# Patient Record
Sex: FEMALE | Race: WHITE | NOT HISPANIC OR LATINO | URBAN - METROPOLITAN AREA
[De-identification: names, ages, dates, MRNs, and addresses within clinical notes are randomized per-mention and may not be internally consistent; named-entity substitution may affect disease eponyms.]

---

## 2017-05-12 ENCOUNTER — ON CAMPUS - OUTPATIENT (AMBULATORY)
Dept: URBAN - METROPOLITAN AREA HOSPITAL 108 | Facility: HOSPITAL | Age: 77
End: 2017-05-12
Payer: COMMERCIAL

## 2017-05-12 DIAGNOSIS — K31.7 POLYP OF STOMACH AND DUODENUM: ICD-10-CM

## 2017-05-12 DIAGNOSIS — K64.4 RESIDUAL HEMORRHOIDAL SKIN TAGS: ICD-10-CM

## 2017-05-12 DIAGNOSIS — K57.30 DIVERTICULOSIS OF LARGE INTESTINE WITHOUT PERFORATION OR ABS: ICD-10-CM

## 2017-05-12 DIAGNOSIS — Z86.010 PERSONAL HISTORY OF COLONIC POLYPS: ICD-10-CM

## 2017-05-12 DIAGNOSIS — K63.5 POLYP OF COLON: ICD-10-CM

## 2017-05-12 DIAGNOSIS — K22.2 ESOPHAGEAL OBSTRUCTION: ICD-10-CM

## 2017-05-12 DIAGNOSIS — K21.9 GASTRO-ESOPHAGEAL REFLUX DISEASE WITHOUT ESOPHAGITIS: ICD-10-CM

## 2017-05-12 DIAGNOSIS — R13.10 DYSPHAGIA, UNSPECIFIED: ICD-10-CM

## 2017-05-12 DIAGNOSIS — K44.9 DIAPHRAGMATIC HERNIA WITHOUT OBSTRUCTION OR GANGRENE: ICD-10-CM

## 2017-05-12 DIAGNOSIS — K29.50 UNSPECIFIED CHRONIC GASTRITIS WITHOUT BLEEDING: ICD-10-CM

## 2017-05-12 PROCEDURE — 43239 EGD BIOPSY SINGLE/MULTIPLE: CPT | Mod: 59 | Performed by: INTERNAL MEDICINE

## 2017-05-12 PROCEDURE — 45385 COLONOSCOPY W/LESION REMOVAL: CPT | Performed by: INTERNAL MEDICINE

## 2017-05-12 PROCEDURE — 43249 ESOPH EGD DILATION <30 MM: CPT | Performed by: INTERNAL MEDICINE

## 2017-11-03 ENCOUNTER — OFFICE (AMBULATORY)
Dept: URBAN - METROPOLITAN AREA CLINIC 70 | Facility: CLINIC | Age: 77
End: 2017-11-03

## 2017-11-03 VITALS
DIASTOLIC BLOOD PRESSURE: 72 MMHG | HEIGHT: 64 IN | HEART RATE: 74 BPM | SYSTOLIC BLOOD PRESSURE: 118 MMHG | WEIGHT: 184 LBS

## 2017-11-03 DIAGNOSIS — Z86.010 PERSONAL HISTORY OF COLONIC POLYPS: ICD-10-CM

## 2017-11-03 DIAGNOSIS — K22.2 ESOPHAGEAL OBSTRUCTION: ICD-10-CM

## 2017-11-03 DIAGNOSIS — K64.4 RESIDUAL HEMORRHOIDAL SKIN TAGS: ICD-10-CM

## 2017-11-03 DIAGNOSIS — R13.10 DYSPHAGIA, UNSPECIFIED: ICD-10-CM

## 2017-11-03 DIAGNOSIS — K21.9 GASTRO-ESOPHAGEAL REFLUX DISEASE WITHOUT ESOPHAGITIS: ICD-10-CM

## 2017-11-03 DIAGNOSIS — K44.9 DIAPHRAGMATIC HERNIA WITHOUT OBSTRUCTION OR GANGRENE: ICD-10-CM

## 2017-11-03 DIAGNOSIS — K29.50 UNSPECIFIED CHRONIC GASTRITIS WITHOUT BLEEDING: ICD-10-CM

## 2017-11-03 DIAGNOSIS — K57.30 DIVERTICULOSIS OF LARGE INTESTINE WITHOUT PERFORATION OR ABS: ICD-10-CM

## 2017-11-03 PROCEDURE — 99213 OFFICE O/P EST LOW 20 MIN: CPT | Performed by: INTERNAL MEDICINE

## 2017-12-06 ENCOUNTER — HOSPITAL ENCOUNTER (OUTPATIENT)
Facility: HOSPITAL | Age: 77
Setting detail: SURGERY ADMIT
End: 2017-12-06
Attending: SURGERY | Admitting: SURGERY

## 2018-01-29 ENCOUNTER — APPOINTMENT (OUTPATIENT)
Dept: PREADMISSION TESTING | Facility: HOSPITAL | Age: 78
End: 2018-01-29

## 2019-08-22 ENCOUNTER — APPOINTMENT (OUTPATIENT)
Dept: GENERAL RADIOLOGY | Facility: HOSPITAL | Age: 79
End: 2019-08-22

## 2019-08-22 ENCOUNTER — HOSPITAL ENCOUNTER (EMERGENCY)
Facility: HOSPITAL | Age: 79
Discharge: HOME | End: 2019-08-22
Attending: EMERGENCY MEDICINE | Admitting: EMERGENCY MEDICINE

## 2019-08-22 VITALS
SYSTOLIC BLOOD PRESSURE: 154 MMHG | DIASTOLIC BLOOD PRESSURE: 65 MMHG | OXYGEN SATURATION: 92 % | HEIGHT: 67 IN | HEART RATE: 70 BPM | RESPIRATION RATE: 16 BRPM | TEMPERATURE: 97.5 F

## 2019-08-22 DIAGNOSIS — F03.91 DEMENTIA WITH BEHAVIORAL DISTURBANCE, UNSPECIFIED DEMENTIA TYPE: ICD-10-CM

## 2019-08-22 DIAGNOSIS — N30.00 ACUTE CYSTITIS WITHOUT HEMATURIA: Primary | ICD-10-CM

## 2019-08-22 LAB
ALBUMIN SERPL-MCNC: 3.7 G/DL (ref 3.5–5.2)
ALBUMIN/GLOB SERPL: 1.3 G/DL
ALP SERPL-CCNC: 79 U/L (ref 39–117)
ALT SERPL W P-5'-P-CCNC: 20 U/L (ref 1–33)
AMPHET+METHAMPHET UR QL: NEGATIVE
ANION GAP SERPL CALCULATED.3IONS-SCNC: 10.7 MMOL/L (ref 5–15)
AST SERPL-CCNC: 39 U/L (ref 1–32)
BACTERIA UR QL AUTO: ABNORMAL /HPF
BARBITURATES UR QL SCN: NEGATIVE
BASOPHILS # BLD AUTO: 0.01 10*3/MM3 (ref 0–0.2)
BASOPHILS NFR BLD AUTO: 0.1 % (ref 0–1.5)
BENZODIAZ UR QL SCN: NEGATIVE
BILIRUB SERPL-MCNC: 0.3 MG/DL (ref 0.2–1.2)
BILIRUB UR QL STRIP: NEGATIVE
BUN BLD-MCNC: 27 MG/DL (ref 8–23)
BUN/CREAT SERPL: 16.9 (ref 7–25)
CALCIUM SPEC-SCNC: 6.8 MG/DL (ref 8.6–10.5)
CANNABINOIDS SERPL QL: NEGATIVE
CHLORIDE SERPL-SCNC: 109 MMOL/L (ref 98–107)
CLARITY UR: ABNORMAL
CO2 SERPL-SCNC: 24.3 MMOL/L (ref 22–29)
COCAINE UR QL: NEGATIVE
COLOR UR: YELLOW
CREAT BLD-MCNC: 1.6 MG/DL (ref 0.57–1)
DEPRECATED RDW RBC AUTO: 55.5 FL (ref 37–54)
EOSINOPHIL # BLD AUTO: 0.23 10*3/MM3 (ref 0–0.4)
EOSINOPHIL NFR BLD AUTO: 3.3 % (ref 0.3–6.2)
ERYTHROCYTE [DISTWIDTH] IN BLOOD BY AUTOMATED COUNT: 14.8 % (ref 12.3–15.4)
ETHANOL BLD-MCNC: <10 MG/DL (ref 0–10)
ETHANOL UR QL: <0.01 %
GFR SERPL CREATININE-BSD FRML MDRD: 31 ML/MIN/1.73
GLOBULIN UR ELPH-MCNC: 2.9 GM/DL
GLUCOSE BLD-MCNC: 116 MG/DL (ref 65–99)
GLUCOSE UR STRIP-MCNC: NEGATIVE MG/DL
GRAN CASTS URNS QL MICRO: ABNORMAL /LPF
HCT VFR BLD AUTO: 41.5 % (ref 34–46.6)
HGB BLD-MCNC: 13 G/DL (ref 12–15.9)
HGB UR QL STRIP.AUTO: ABNORMAL
HYALINE CASTS UR QL AUTO: ABNORMAL /LPF
IMM GRANULOCYTES # BLD AUTO: 0.02 10*3/MM3 (ref 0–0.05)
IMM GRANULOCYTES NFR BLD AUTO: 0.3 % (ref 0–0.5)
KETONES UR QL STRIP: ABNORMAL
LEUKOCYTE ESTERASE UR QL STRIP.AUTO: ABNORMAL
LYMPHOCYTES # BLD AUTO: 2.03 10*3/MM3 (ref 0.7–3.1)
LYMPHOCYTES NFR BLD AUTO: 28.8 % (ref 19.6–45.3)
MCH RBC QN AUTO: 31.9 PG (ref 26.6–33)
MCHC RBC AUTO-ENTMCNC: 31.3 G/DL (ref 31.5–35.7)
MCV RBC AUTO: 102 FL (ref 79–97)
METHADONE UR QL SCN: NEGATIVE
MONOCYTES # BLD AUTO: 0.59 10*3/MM3 (ref 0.1–0.9)
MONOCYTES NFR BLD AUTO: 8.4 % (ref 5–12)
NEUTROPHILS # BLD AUTO: 4.18 10*3/MM3 (ref 1.7–7)
NEUTROPHILS NFR BLD AUTO: 59.1 % (ref 42.7–76)
NITRITE UR QL STRIP: POSITIVE
NRBC BLD AUTO-RTO: 0.1 /100 WBC (ref 0–0.2)
OPIATES UR QL: NEGATIVE
OXYCODONE UR QL SCN: NEGATIVE
PH UR STRIP.AUTO: <=5 [PH] (ref 5–8)
PLATELET # BLD AUTO: 157 10*3/MM3 (ref 140–450)
PMV BLD AUTO: 10.3 FL (ref 6–12)
POTASSIUM BLD-SCNC: 5.1 MMOL/L (ref 3.5–5.2)
PROT SERPL-MCNC: 6.6 G/DL (ref 6–8.5)
PROT UR QL STRIP: ABNORMAL
RBC # BLD AUTO: 4.07 10*6/MM3 (ref 3.77–5.28)
RBC # UR: ABNORMAL /HPF
REF LAB TEST METHOD: ABNORMAL
SODIUM BLD-SCNC: 144 MMOL/L (ref 136–145)
SP GR UR STRIP: 1.02 (ref 1–1.03)
SQUAMOUS #/AREA URNS HPF: ABNORMAL /HPF
TROPONIN T SERPL-MCNC: <0.01 NG/ML (ref 0–0.03)
TSH SERPL DL<=0.05 MIU/L-ACNC: 1.25 MIU/ML (ref 0.27–4.2)
UROBILINOGEN UR QL STRIP: ABNORMAL
WBC NRBC COR # BLD: 7.06 10*3/MM3 (ref 3.4–10.8)
WBC UR QL AUTO: ABNORMAL /HPF

## 2019-08-22 PROCEDURE — 80307 DRUG TEST PRSMV CHEM ANLYZR: CPT | Performed by: PHYSICIAN ASSISTANT

## 2019-08-22 PROCEDURE — 80053 COMPREHEN METABOLIC PANEL: CPT | Performed by: PHYSICIAN ASSISTANT

## 2019-08-22 PROCEDURE — 93010 ELECTROCARDIOGRAM REPORT: CPT | Performed by: INTERNAL MEDICINE

## 2019-08-22 PROCEDURE — 84443 ASSAY THYROID STIM HORMONE: CPT | Performed by: EMERGENCY MEDICINE

## 2019-08-22 PROCEDURE — 84484 ASSAY OF TROPONIN QUANT: CPT | Performed by: PHYSICIAN ASSISTANT

## 2019-08-22 PROCEDURE — 81001 URINALYSIS AUTO W/SCOPE: CPT | Performed by: PHYSICIAN ASSISTANT

## 2019-08-22 PROCEDURE — 87086 URINE CULTURE/COLONY COUNT: CPT | Performed by: PHYSICIAN ASSISTANT

## 2019-08-22 PROCEDURE — 85025 COMPLETE CBC W/AUTO DIFF WBC: CPT | Performed by: PHYSICIAN ASSISTANT

## 2019-08-22 PROCEDURE — 87147 CULTURE TYPE IMMUNOLOGIC: CPT | Performed by: PHYSICIAN ASSISTANT

## 2019-08-22 PROCEDURE — 93005 ELECTROCARDIOGRAM TRACING: CPT | Performed by: PHYSICIAN ASSISTANT

## 2019-08-22 PROCEDURE — 71045 X-RAY EXAM CHEST 1 VIEW: CPT

## 2019-08-22 PROCEDURE — 87186 SC STD MICRODIL/AGAR DIL: CPT | Performed by: PHYSICIAN ASSISTANT

## 2019-08-22 PROCEDURE — 99285 EMERGENCY DEPT VISIT HI MDM: CPT

## 2019-08-22 RX ORDER — CEPHALEXIN 500 MG/1
500 CAPSULE ORAL 2 TIMES DAILY
Qty: 14 CAPSULE | Refills: 0 | Status: SHIPPED | OUTPATIENT
Start: 2019-08-22 | End: 2019-08-29

## 2019-08-22 RX ORDER — CEPHALEXIN 500 MG/1
500 CAPSULE ORAL ONCE
Status: COMPLETED | OUTPATIENT
Start: 2019-08-22 | End: 2019-08-22

## 2019-08-22 RX ADMIN — CEPHALEXIN 500 MG: 500 CAPSULE ORAL at 22:20

## 2019-08-22 NOTE — ED PROVIDER NOTES
"EMERGENCY DEPARTMENT ENCOUNTER    Room Number:  28/28  Date seen:  8/22/2019  Time seen: 7:13 PM  PCP: Luis Williamson MD    HPI:  Chief complaint: Behavioral Issues  Context:Tiara Hernandes is a 78 y.o. female who presents to the ED with reported behavioral issues that's occurred today per the . Pt denies SI and HI. Pt states that she called the police earlier today due to an argument with her  in which became \"heated\" and EMS brought her to the ED and she states that she was not feeling well. Pt's  states that the pt was dx with Dementia in 2015, also has hx of Bipolar.  He states that EMS offered to bring her to the hospital and she excepted, he does not feel her current behavior is significantly out of the ordinary for her.    Onset: gradual  Location:n/a  Radiation: n/a  Duration: today  Timing: constant  Character:behavior issues  Aggravating Factors: none none  Alleviating Factors: none  Severity: mild      ALLERGIES  Patient has no known allergies.    PAST MEDICAL HISTORY  Active Ambulatory Problems     Diagnosis Date Noted   • No Active Ambulatory Problems     Resolved Ambulatory Problems     Diagnosis Date Noted   • No Resolved Ambulatory Problems     No Additional Past Medical History       PAST SURGICAL HISTORY  No past surgical history on file.    FAMILY HISTORY  No family history on file.    SOCIAL HISTORY  Social History     Socioeconomic History   • Marital status:      Spouse name: Not on file   • Number of children: Not on file   • Years of education: Not on file   • Highest education level: Not on file       REVIEW OF SYSTEMS  Review of Systems   Unable to perform ROS: Dementia   Constitutional: Negative for chills and fever.   HENT: Negative.    Respiratory: Negative for cough and shortness of breath.    Cardiovascular: Negative for chest pain.   Gastrointestinal: Negative for abdominal pain, nausea and vomiting.   Genitourinary: Negative for dysuria and hematuria. "   Skin: Negative for rash.   Neurological: Negative.    Psychiatric/Behavioral: Positive for behavioral problems. Negative for suicidal ideas.       PHYSICAL EXAM  ED Triage Vitals   Temp Heart Rate Resp BP SpO2   08/22/19 1830 08/22/19 1830 08/22/19 1830 08/22/19 1830 08/22/19 1844   97.5 °F (36.4 °C) 106 16 105/62 94 %      Temp src Heart Rate Source Patient Position BP Location FiO2 (%)   08/22/19 1830 -- -- -- --   Tympanic         Physical Exam   Constitutional: She is well-developed, well-nourished, and in no distress.   HENT:   Head: Normocephalic and atraumatic.   Right Ear: External ear normal.   Left Ear: External ear normal.   Nose: Nose normal.   Mouth/Throat: Oropharynx is clear and moist and mucous membranes are normal.   Eyes: Conjunctivae are normal.   Neck: Normal range of motion.   Cardiovascular: Regular rhythm. Tachycardia present.   Pulmonary/Chest: Effort normal and breath sounds normal.   Abdominal: Soft. She exhibits no distension. There is no tenderness.   Musculoskeletal: Normal range of motion.   Neurological: She is alert.   Pt is oriented to person and place, disoriented to the date. No focal neuro deficits noted.   Skin: Skin is warm and dry.   Psychiatric: Mood and affect normal. She expresses no homicidal and no suicidal ideation.   Nursing note and vitals reviewed.      LAB RESULTS  Recent Results (from the past 24 hour(s))   Comprehensive Metabolic Panel    Collection Time: 08/22/19  7:57 PM   Result Value Ref Range    Glucose 116 (H) 65 - 99 mg/dL    BUN 27 (H) 8 - 23 mg/dL    Creatinine 1.60 (H) 0.57 - 1.00 mg/dL    Sodium 144 136 - 145 mmol/L    Potassium 5.1 3.5 - 5.2 mmol/L    Chloride 109 (H) 98 - 107 mmol/L    CO2 24.3 22.0 - 29.0 mmol/L    Calcium 6.8 (L) 8.6 - 10.5 mg/dL    Total Protein 6.6 6.0 - 8.5 g/dL    Albumin 3.70 3.50 - 5.20 g/dL    ALT (SGPT) 20 1 - 33 U/L    AST (SGOT) 39 (H) 1 - 32 U/L    Alkaline Phosphatase 79 39 - 117 U/L    Total Bilirubin 0.3 0.2 - 1.2  mg/dL    eGFR Non African Amer 31 (L) >60 mL/min/1.73    Globulin 2.9 gm/dL    A/G Ratio 1.3 g/dL    BUN/Creatinine Ratio 16.9 7.0 - 25.0    Anion Gap 10.7 5.0 - 15.0 mmol/L   Troponin    Collection Time: 08/22/19  7:57 PM   Result Value Ref Range    Troponin T <0.010 0.000 - 0.030 ng/mL   Ethanol    Collection Time: 08/22/19  7:57 PM   Result Value Ref Range    Ethanol <10 0 - 10 mg/dL    Ethanol % <0.010 %   CBC Auto Differential    Collection Time: 08/22/19  7:57 PM   Result Value Ref Range    WBC 7.06 3.40 - 10.80 10*3/mm3    RBC 4.07 3.77 - 5.28 10*6/mm3    Hemoglobin 13.0 12.0 - 15.9 g/dL    Hematocrit 41.5 34.0 - 46.6 %    .0 (H) 79.0 - 97.0 fL    MCH 31.9 26.6 - 33.0 pg    MCHC 31.3 (L) 31.5 - 35.7 g/dL    RDW 14.8 12.3 - 15.4 %    RDW-SD 55.5 (H) 37.0 - 54.0 fl    MPV 10.3 6.0 - 12.0 fL    Platelets 157 140 - 450 10*3/mm3    Neutrophil % 59.1 42.7 - 76.0 %    Lymphocyte % 28.8 19.6 - 45.3 %    Monocyte % 8.4 5.0 - 12.0 %    Eosinophil % 3.3 0.3 - 6.2 %    Basophil % 0.1 0.0 - 1.5 %    Immature Grans % 0.3 0.0 - 0.5 %    Neutrophils, Absolute 4.18 1.70 - 7.00 10*3/mm3    Lymphocytes, Absolute 2.03 0.70 - 3.10 10*3/mm3    Monocytes, Absolute 0.59 0.10 - 0.90 10*3/mm3    Eosinophils, Absolute 0.23 0.00 - 0.40 10*3/mm3    Basophils, Absolute 0.01 0.00 - 0.20 10*3/mm3    Immature Grans, Absolute 0.02 0.00 - 0.05 10*3/mm3    nRBC 0.1 0.0 - 0.2 /100 WBC   TSH    Collection Time: 08/22/19  7:57 PM   Result Value Ref Range    TSH 1.250 0.270 - 4.200 mIU/mL   Urinalysis With Microscopic If Indicated (No Culture) - Urine, Catheter    Collection Time: 08/22/19  8:02 PM   Result Value Ref Range    Color, UA Yellow Yellow, Straw    Appearance, UA Cloudy (A) Clear    pH, UA <=5.0 5.0 - 8.0    Specific Gravity, UA 1.023 1.005 - 1.030    Glucose, UA Negative Negative    Ketones, UA Trace (A) Negative    Bilirubin, UA Negative Negative    Blood, UA Moderate (2+) (A) Negative    Protein, UA 30 mg/dL (1+) (A) Negative     Leuk Esterase, UA Moderate (2+) (A) Negative    Nitrite, UA Positive (A) Negative    Urobilinogen, UA 1.0 E.U./dL 0.2 - 1.0 E.U./dL   Urine Drug Screen - Urine, Catheter    Collection Time: 08/22/19  8:02 PM   Result Value Ref Range    Amphet/Methamphet, Screen Negative Negative    Barbiturates Screen, Urine Negative Negative    Benzodiazepine Screen, Urine Negative Negative    Cocaine Screen, Urine Negative Negative    Opiate Screen Negative Negative    THC, Screen, Urine Negative Negative    Methadone Screen, Urine Negative Negative    Oxycodone Screen, Urine Negative Negative   Urinalysis, Microscopic Only - Urine, Catheter    Collection Time: 08/22/19  8:02 PM   Result Value Ref Range    RBC, UA 3-5 (A) None Seen, 0-2 /HPF    WBC, UA Too Numerous to Count (A) None Seen, 0-2 /HPF    Bacteria, UA Trace (A) None Seen /HPF    Squamous Epithelial Cells, UA 0-2 None Seen, 0-2 /HPF    Hyaline Casts, UA None Seen None Seen /LPF    Granular Casts, UA 0-2 None Seen /LPF    Methodology Manual Light Microscopy        I ordered the above labs and reviewed the results    RADIOLOGY  XR Chest 1 View   Final Result   FINDINGS AND IMPRESSION:   Left pacemaker is present. 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 There is a right shoulder arthroplasty which isn't   well-visualized.       The lungs are hypoinflated. The left base is not well evaluated due to   patient body habitus. Asymmetric opacification over the left base may be   related to overlying soft tissue density versus acute infiltrate and   correlation with patient history is recommended. Findings can be further   evaluated with PA and lateral standing chest radiographs if clinically   indicated.       No pleural effusion or pneumothorax is seen. The heart size is   accentuated by low lung volumes..       This report was finalized on 8/22/2019 7:47 PM by Dr. Rainer Salinas M.D.              I ordered the above noted radiological studies and reviewed the  "images on the PACS system.      MEDICATIONS GIVEN IN ER  Medications   cephalexin (KEFLEX) capsule 500 mg (not administered)       EKG  Interpreted by ED Physician Dr. Schwartz    PROCEDURES  Procedures      PROGRESS AND CONSULTS     2129- Rechecked pt who is resting comfortably in NAD. Informed pt of the lab and imaging results. D/w pt the plan to DC with abx. Pt understands and agrees with plan. All questions answered.        2136- Reviewed pt's history and workup with Dr. Schwartz.  After a bedside evaluation; Dr. Schwartz agrees with the plan of care      Disposition vitals:  /64   Pulse 102   Temp 97.5 °F (36.4 °C) (Tympanic)   Resp 16   Ht 170.2 cm (67\")   SpO2 94%       DIAGNOSIS  Final diagnoses:   Acute cystitis without hematuria   Dementia with behavioral disturbance, unspecified dementia type       FOLLOW UP   Luis Williamson MD  [address] [facility]  [id number]  [place] [id number] [phone]            RX     Medication List      New Prescriptions    cephalexin 500 MG capsule  Commonly known as:  KEFLEX  Take 1 capsule by mouth 2 (Two) Times a Day for 7 days.              Documentation assistance provided by beatriz Albarado for Kerri Goodman PA-C.  Information recorded by the scrwenceslao was done at my direction and has been verified and validated by me.         Ruth Ann Albarado  08/22/19 2220       Kerri Goodman PA  08/22/19 2316    "

## 2019-08-22 NOTE — ED NOTES
Report received from ERIC Hutchinson. Pt changed into gown, monitors applied.  at bedside, PCXR at bedside. Pt denies any complaints at present, purewick applied per OLVIN Roper. Pt is pleasant, behaving appropriately.     Stefani Ayon RN  08/22/19 1937

## 2019-08-22 NOTE — ED NOTES
Pt from home due to behavioral issues.  Pt has dementia. IS argumentative with . Pt is alert and oriented, states she feels her meds aren't right     Xu Burdick RN  08/22/19 9164

## 2019-08-23 NOTE — ED PROVIDER NOTES
MD ATTESTATION NOTE    The DEVON and I have discussed this patient's history, physical exam, and treatment plan.  I have reviewed the documentation and personally had a face to face interaction with the patient. I affirm the documentation and agree with the treatment and plan.  The attached note describes my personal findings.    Patient's brought him after an argument with her .  She has advanced dementia, and her  takes care of her at home.  Tonight apparently they got into an argument, at some point she felt like she should call the police and they came in to do a welfare check.  There were no signs of physical altercation or abuse, and she was brought in for evaluation, really without wanting to.    She is awake and alert, confused as is her baseline, and completely cooperative.  The  Avers that there is nothing new about her behavior at this time, and that he really like to take her home.  Her work-up shows only a UTI, with no signs of sepsis.  They are agreeable with discharge home on oral antibiotics.  Access consult would not be of benefit at this time, as there is an active infection.  We discussed with them that an outpatient work-up in the near future would be better.     Saeed Schwartz MD  08/22/19 4693

## 2019-08-23 NOTE — DISCHARGE INSTRUCTIONS
Take the antibiotics until completed.  Follow up with Dr. Williamson for recheck on Monday.  Return to the ER as needed.

## 2019-08-25 LAB — BACTERIA SPEC AEROBE CULT: ABNORMAL

## 2022-03-29 ENCOUNTER — APPOINTMENT (OUTPATIENT)
Dept: GENERAL RADIOLOGY | Facility: HOSPITAL | Age: 82
End: 2022-03-29

## 2022-03-29 ENCOUNTER — APPOINTMENT (OUTPATIENT)
Dept: CT IMAGING | Facility: HOSPITAL | Age: 82
End: 2022-03-29

## 2022-03-29 ENCOUNTER — HOSPITAL ENCOUNTER (INPATIENT)
Facility: HOSPITAL | Age: 82
LOS: 5 days | Discharge: NURSING, REHAB OR LONG-TERM CARE | End: 2022-04-03
Attending: EMERGENCY MEDICINE | Admitting: HOSPITALIST

## 2022-03-29 DIAGNOSIS — F03.91 DEMENTIA WITH BEHAVIORAL DISTURBANCE, UNSPECIFIED DEMENTIA TYPE: ICD-10-CM

## 2022-03-29 DIAGNOSIS — D68.32 WARFARIN-INDUCED COAGULOPATHY: ICD-10-CM

## 2022-03-29 DIAGNOSIS — T45.515A WARFARIN-INDUCED COAGULOPATHY: ICD-10-CM

## 2022-03-29 DIAGNOSIS — T17.908A ASPIRATION INTO AIRWAY, INITIAL ENCOUNTER: ICD-10-CM

## 2022-03-29 DIAGNOSIS — I63.9 CEREBROVASCULAR ACCIDENT (CVA), UNSPECIFIED MECHANISM: Primary | ICD-10-CM

## 2022-03-29 DIAGNOSIS — E87.5 HYPERKALEMIA: ICD-10-CM

## 2022-03-29 DIAGNOSIS — R41.82 ALTERED MENTAL STATUS, UNSPECIFIED ALTERED MENTAL STATUS TYPE: ICD-10-CM

## 2022-03-29 PROBLEM — Z79.01 CHRONIC ANTICOAGULATION: Status: ACTIVE | Noted: 2022-03-29

## 2022-03-29 PROBLEM — Z85.3 HISTORY OF BREAST CANCER: Status: ACTIVE | Noted: 2022-03-29

## 2022-03-29 PROBLEM — F03.90 DEMENTIA (HCC): Status: ACTIVE | Noted: 2022-03-29

## 2022-03-29 PROBLEM — Z66 DNR (DO NOT RESUSCITATE): Status: ACTIVE | Noted: 2022-03-29

## 2022-03-29 PROBLEM — I10 HTN (HYPERTENSION): Status: ACTIVE | Noted: 2022-03-29

## 2022-03-29 LAB
ABO GROUP BLD: NORMAL
ALBUMIN SERPL-MCNC: 3.9 G/DL (ref 3.5–5.2)
ALBUMIN/GLOB SERPL: 1.3 G/DL
ALP SERPL-CCNC: 65 U/L (ref 39–117)
ALT SERPL W P-5'-P-CCNC: 12 U/L (ref 1–33)
ANION GAP SERPL CALCULATED.3IONS-SCNC: 7 MMOL/L (ref 5–15)
APTT PPP: 32.3 SECONDS (ref 22.7–35.4)
AST SERPL-CCNC: 18 U/L (ref 1–32)
BASOPHILS # BLD AUTO: 0.02 10*3/MM3 (ref 0–0.2)
BASOPHILS NFR BLD AUTO: 0.3 % (ref 0–1.5)
BILIRUB SERPL-MCNC: 0.4 MG/DL (ref 0–1.2)
BLD GP AB SCN SERPL QL: NEGATIVE
BUN SERPL-MCNC: 19 MG/DL (ref 8–23)
BUN/CREAT SERPL: 15.2 (ref 7–25)
CALCIUM SPEC-SCNC: 9.1 MG/DL (ref 8.6–10.5)
CHLORIDE SERPL-SCNC: 105 MMOL/L (ref 98–107)
CO2 SERPL-SCNC: 27 MMOL/L (ref 22–29)
CREAT SERPL-MCNC: 1.25 MG/DL (ref 0.57–1)
DEPRECATED RDW RBC AUTO: 50.8 FL (ref 37–54)
EGFRCR SERPLBLD CKD-EPI 2021: 43.9 ML/MIN/1.73
EOSINOPHIL # BLD AUTO: 0.26 10*3/MM3 (ref 0–0.4)
EOSINOPHIL NFR BLD AUTO: 3.7 % (ref 0.3–6.2)
ERYTHROCYTE [DISTWIDTH] IN BLOOD BY AUTOMATED COUNT: 14.7 % (ref 12.3–15.4)
GLOBULIN UR ELPH-MCNC: 2.9 GM/DL
GLUCOSE SERPL-MCNC: 99 MG/DL (ref 65–99)
HCT VFR BLD AUTO: 40.1 % (ref 34–46.6)
HGB BLD-MCNC: 13 G/DL (ref 12–15.9)
HOLD SPECIMEN: NORMAL
HOLD SPECIMEN: NORMAL
IMM GRANULOCYTES # BLD AUTO: 0.02 10*3/MM3 (ref 0–0.05)
IMM GRANULOCYTES NFR BLD AUTO: 0.3 % (ref 0–0.5)
INR PPP: 2.37 (ref 0.9–1.1)
LYMPHOCYTES # BLD AUTO: 1.97 10*3/MM3 (ref 0.7–3.1)
LYMPHOCYTES NFR BLD AUTO: 28 % (ref 19.6–45.3)
MCH RBC QN AUTO: 30.6 PG (ref 26.6–33)
MCHC RBC AUTO-ENTMCNC: 32.4 G/DL (ref 31.5–35.7)
MCV RBC AUTO: 94.4 FL (ref 79–97)
MONOCYTES # BLD AUTO: 0.57 10*3/MM3 (ref 0.1–0.9)
MONOCYTES NFR BLD AUTO: 8.1 % (ref 5–12)
NEUTROPHILS NFR BLD AUTO: 4.2 10*3/MM3 (ref 1.7–7)
NEUTROPHILS NFR BLD AUTO: 59.6 % (ref 42.7–76)
NRBC BLD AUTO-RTO: 0 /100 WBC (ref 0–0.2)
NT-PROBNP SERPL-MCNC: 735 PG/ML (ref 0–1800)
PLATELET # BLD AUTO: 305 10*3/MM3 (ref 140–450)
PMV BLD AUTO: 11.1 FL (ref 6–12)
POTASSIUM SERPL-SCNC: 5.6 MMOL/L (ref 3.5–5.2)
PROT SERPL-MCNC: 6.8 G/DL (ref 6–8.5)
PROTHROMBIN TIME: 25.9 SECONDS (ref 11.7–14.2)
QT INTERVAL: 413 MS
RBC # BLD AUTO: 4.25 10*6/MM3 (ref 3.77–5.28)
RH BLD: POSITIVE
SARS-COV-2 RNA PNL SPEC NAA+PROBE: NOT DETECTED
SODIUM SERPL-SCNC: 139 MMOL/L (ref 136–145)
T&S EXPIRATION DATE: NORMAL
TROPONIN T SERPL-MCNC: <0.01 NG/ML (ref 0–0.03)
WBC NRBC COR # BLD: 7.04 10*3/MM3 (ref 3.4–10.8)
WHOLE BLOOD HOLD SPECIMEN: NORMAL
WHOLE BLOOD HOLD SPECIMEN: NORMAL

## 2022-03-29 PROCEDURE — 70496 CT ANGIOGRAPHY HEAD: CPT

## 2022-03-29 PROCEDURE — 85610 PROTHROMBIN TIME: CPT | Performed by: EMERGENCY MEDICINE

## 2022-03-29 PROCEDURE — 80053 COMPREHEN METABOLIC PANEL: CPT | Performed by: EMERGENCY MEDICINE

## 2022-03-29 PROCEDURE — 86900 BLOOD TYPING SEROLOGIC ABO: CPT | Performed by: EMERGENCY MEDICINE

## 2022-03-29 PROCEDURE — 85730 THROMBOPLASTIN TIME PARTIAL: CPT | Performed by: EMERGENCY MEDICINE

## 2022-03-29 PROCEDURE — 71045 X-RAY EXAM CHEST 1 VIEW: CPT

## 2022-03-29 PROCEDURE — 94799 UNLISTED PULMONARY SVC/PX: CPT

## 2022-03-29 PROCEDURE — 99284 EMERGENCY DEPT VISIT MOD MDM: CPT

## 2022-03-29 PROCEDURE — 94760 N-INVAS EAR/PLS OXIMETRY 1: CPT

## 2022-03-29 PROCEDURE — 99223 1ST HOSP IP/OBS HIGH 75: CPT | Performed by: PSYCHIATRY & NEUROLOGY

## 2022-03-29 PROCEDURE — 93010 ELECTROCARDIOGRAM REPORT: CPT | Performed by: INTERNAL MEDICINE

## 2022-03-29 PROCEDURE — 82565 ASSAY OF CREATININE: CPT

## 2022-03-29 PROCEDURE — 99285 EMERGENCY DEPT VISIT HI MDM: CPT

## 2022-03-29 PROCEDURE — 36415 COLL VENOUS BLD VENIPUNCTURE: CPT

## 2022-03-29 PROCEDURE — 86901 BLOOD TYPING SEROLOGIC RH(D): CPT | Performed by: EMERGENCY MEDICINE

## 2022-03-29 PROCEDURE — 83880 ASSAY OF NATRIURETIC PEPTIDE: CPT | Performed by: EMERGENCY MEDICINE

## 2022-03-29 PROCEDURE — 84484 ASSAY OF TROPONIN QUANT: CPT | Performed by: EMERGENCY MEDICINE

## 2022-03-29 PROCEDURE — 86850 RBC ANTIBODY SCREEN: CPT | Performed by: EMERGENCY MEDICINE

## 2022-03-29 PROCEDURE — 85025 COMPLETE CBC W/AUTO DIFF WBC: CPT | Performed by: EMERGENCY MEDICINE

## 2022-03-29 PROCEDURE — 94640 AIRWAY INHALATION TREATMENT: CPT

## 2022-03-29 PROCEDURE — 63710000001 INSULIN REGULAR HUMAN PER 5 UNITS: Performed by: EMERGENCY MEDICINE

## 2022-03-29 PROCEDURE — 93005 ELECTROCARDIOGRAM TRACING: CPT | Performed by: EMERGENCY MEDICINE

## 2022-03-29 PROCEDURE — 70498 CT ANGIOGRAPHY NECK: CPT

## 2022-03-29 PROCEDURE — 0 IOPAMIDOL PER 1 ML: Performed by: EMERGENCY MEDICINE

## 2022-03-29 PROCEDURE — 94761 N-INVAS EAR/PLS OXIMETRY MLT: CPT

## 2022-03-29 PROCEDURE — 87635 SARS-COV-2 COVID-19 AMP PRB: CPT | Performed by: EMERGENCY MEDICINE

## 2022-03-29 PROCEDURE — 0042T HC CT CEREBRAL PERFUSION W/WO CONTRAST: CPT

## 2022-03-29 RX ORDER — SODIUM CHLORIDE 9 MG/ML
125 INJECTION, SOLUTION INTRAVENOUS CONTINUOUS
Status: DISCONTINUED | OUTPATIENT
Start: 2022-03-29 | End: 2022-03-29

## 2022-03-29 RX ORDER — WARFARIN SODIUM 3 MG/1
3 TABLET ORAL 3 TIMES WEEKLY
COMMUNITY
End: 2022-04-19

## 2022-03-29 RX ORDER — NYSTATIN 100000 [USP'U]/G
1 POWDER TOPICAL 2 TIMES DAILY
COMMUNITY
End: 2022-04-22 | Stop reason: HOSPADM

## 2022-03-29 RX ORDER — NITROGLYCERIN 400 UG/1
1 SPRAY ORAL
COMMUNITY
End: 2022-04-22 | Stop reason: HOSPADM

## 2022-03-29 RX ORDER — DEXTROSE MONOHYDRATE 25 G/50ML
50 INJECTION, SOLUTION INTRAVENOUS ONCE
Status: COMPLETED | OUTPATIENT
Start: 2022-03-29 | End: 2022-03-29

## 2022-03-29 RX ORDER — OMEPRAZOLE 40 MG/1
40 CAPSULE, DELAYED RELEASE ORAL NIGHTLY
COMMUNITY
End: 2022-04-22 | Stop reason: HOSPADM

## 2022-03-29 RX ORDER — SOTALOL HYDROCHLORIDE 80 MG/1
40 TABLET ORAL 2 TIMES DAILY
COMMUNITY
End: 2022-04-22 | Stop reason: HOSPADM

## 2022-03-29 RX ORDER — SODIUM CHLORIDE 9 MG/ML
125 INJECTION, SOLUTION INTRAVENOUS CONTINUOUS
Status: DISCONTINUED | OUTPATIENT
Start: 2022-03-29 | End: 2022-03-30

## 2022-03-29 RX ORDER — SODIUM CHLORIDE 0.9 % (FLUSH) 0.9 %
10 SYRINGE (ML) INJECTION AS NEEDED
Status: DISCONTINUED | OUTPATIENT
Start: 2022-03-29 | End: 2022-04-03 | Stop reason: HOSPADM

## 2022-03-29 RX ORDER — ACETAMINOPHEN 160 MG/5ML
650 SOLUTION ORAL EVERY 4 HOURS PRN
Status: DISCONTINUED | OUTPATIENT
Start: 2022-03-29 | End: 2022-04-03 | Stop reason: HOSPADM

## 2022-03-29 RX ORDER — WARFARIN SODIUM 2.5 MG/1
2.5 TABLET ORAL
COMMUNITY
End: 2022-04-22 | Stop reason: HOSPADM

## 2022-03-29 RX ORDER — RISPERIDONE 0.25 MG/1
0.25 TABLET ORAL 2 TIMES DAILY
COMMUNITY
End: 2022-04-22 | Stop reason: HOSPADM

## 2022-03-29 RX ORDER — ACETAMINOPHEN 325 MG/1
650 TABLET ORAL EVERY 4 HOURS PRN
Status: DISCONTINUED | OUTPATIENT
Start: 2022-03-29 | End: 2022-04-03 | Stop reason: HOSPADM

## 2022-03-29 RX ORDER — NYSTATIN 100000 [USP'U]/G
POWDER TOPICAL EVERY 8 HOURS SCHEDULED
Status: DISCONTINUED | OUTPATIENT
Start: 2022-03-30 | End: 2022-03-30 | Stop reason: SDUPTHER

## 2022-03-29 RX ORDER — ONDANSETRON 2 MG/ML
4 INJECTION INTRAMUSCULAR; INTRAVENOUS EVERY 6 HOURS PRN
Status: DISCONTINUED | OUTPATIENT
Start: 2022-03-29 | End: 2022-04-03 | Stop reason: HOSPADM

## 2022-03-29 RX ORDER — ALBUTEROL SULFATE 2.5 MG/3ML
2.5 SOLUTION RESPIRATORY (INHALATION) ONCE
Status: COMPLETED | OUTPATIENT
Start: 2022-03-29 | End: 2022-03-29

## 2022-03-29 RX ORDER — CLONAZEPAM 0.5 MG/1
0.5 TABLET ORAL EVERY 6 HOURS
COMMUNITY
End: 2022-04-03 | Stop reason: HOSPADM

## 2022-03-29 RX ORDER — SODIUM CHLORIDE 0.9 % (FLUSH) 0.9 %
10 SYRINGE (ML) INJECTION EVERY 12 HOURS SCHEDULED
Status: DISCONTINUED | OUTPATIENT
Start: 2022-03-29 | End: 2022-04-03 | Stop reason: HOSPADM

## 2022-03-29 RX ORDER — ACETAMINOPHEN 650 MG/1
650 SUPPOSITORY RECTAL EVERY 4 HOURS PRN
Status: DISCONTINUED | OUTPATIENT
Start: 2022-03-29 | End: 2022-04-03 | Stop reason: HOSPADM

## 2022-03-29 RX ADMIN — SODIUM CHLORIDE 125 ML/HR: 9 INJECTION, SOLUTION INTRAVENOUS at 21:04

## 2022-03-29 RX ADMIN — Medication 10 ML: at 21:07

## 2022-03-29 RX ADMIN — ALBUTEROL SULFATE 2.5 MG: 2.5 SOLUTION RESPIRATORY (INHALATION) at 15:31

## 2022-03-29 RX ADMIN — DEXTROSE MONOHYDRATE 50 ML: 25 INJECTION, SOLUTION INTRAVENOUS at 17:53

## 2022-03-29 RX ADMIN — INSULIN HUMAN 10 UNITS: 100 INJECTION, SOLUTION PARENTERAL at 17:53

## 2022-03-29 RX ADMIN — SODIUM CHLORIDE 500 ML: 9 INJECTION, SOLUTION INTRAVENOUS at 12:29

## 2022-03-29 RX ADMIN — SODIUM BICARBONATE 50 MEQ: 84 INJECTION, SOLUTION INTRAVENOUS at 17:53

## 2022-03-29 RX ADMIN — IOPAMIDOL 150 ML: 755 INJECTION, SOLUTION INTRAVENOUS at 12:00

## 2022-03-29 RX ADMIN — SODIUM CHLORIDE 125 ML/HR: 9 INJECTION, SOLUTION INTRAVENOUS at 12:29

## 2022-03-30 PROBLEM — R41.82 ALTERED MENTAL STATUS: Status: ACTIVE | Noted: 2022-03-30

## 2022-03-30 PROBLEM — Z86.718 HISTORY OF DVT (DEEP VEIN THROMBOSIS): Status: ACTIVE | Noted: 2022-03-30

## 2022-03-30 LAB
ALBUMIN SERPL-MCNC: 3.3 G/DL (ref 3.5–5.2)
ALBUMIN/GLOB SERPL: 1.1 G/DL
ALP SERPL-CCNC: 61 U/L (ref 39–117)
ALT SERPL W P-5'-P-CCNC: 22 U/L (ref 1–33)
ANION GAP SERPL CALCULATED.3IONS-SCNC: 17.9 MMOL/L (ref 5–15)
ANION GAP SERPL CALCULATED.3IONS-SCNC: 6 MMOL/L (ref 5–15)
AST SERPL-CCNC: 18 U/L (ref 1–32)
BACTERIA UR QL AUTO: ABNORMAL /HPF
BASOPHILS # BLD AUTO: 0.02 10*3/MM3 (ref 0–0.2)
BASOPHILS NFR BLD AUTO: 0.3 % (ref 0–1.5)
BILIRUB SERPL-MCNC: 0.5 MG/DL (ref 0–1.2)
BILIRUB UR QL STRIP: NEGATIVE
BUN SERPL-MCNC: 15 MG/DL (ref 8–23)
BUN SERPL-MCNC: 16 MG/DL (ref 8–23)
BUN/CREAT SERPL: 14.4 (ref 7–25)
BUN/CREAT SERPL: 17.8 (ref 7–25)
CALCIUM SPEC-SCNC: 8.8 MG/DL (ref 8.6–10.5)
CALCIUM SPEC-SCNC: 8.8 MG/DL (ref 8.6–10.5)
CHLORIDE SERPL-SCNC: 107 MMOL/L (ref 98–107)
CHLORIDE SERPL-SCNC: 110 MMOL/L (ref 98–107)
CLARITY UR: CLEAR
CO2 SERPL-SCNC: 26 MMOL/L (ref 22–29)
CO2 SERPL-SCNC: 26.1 MMOL/L (ref 22–29)
COLOR UR: YELLOW
CREAT SERPL-MCNC: 0.9 MG/DL (ref 0.57–1)
CREAT SERPL-MCNC: 1.04 MG/DL (ref 0.57–1)
DEPRECATED RDW RBC AUTO: 48.6 FL (ref 37–54)
EGFRCR SERPLBLD CKD-EPI 2021: 54.8 ML/MIN/1.73
EGFRCR SERPLBLD CKD-EPI 2021: 65.2 ML/MIN/1.73
EOSINOPHIL # BLD AUTO: 0.22 10*3/MM3 (ref 0–0.4)
EOSINOPHIL NFR BLD AUTO: 3.7 % (ref 0.3–6.2)
ERYTHROCYTE [DISTWIDTH] IN BLOOD BY AUTOMATED COUNT: 14.5 % (ref 12.3–15.4)
GLOBULIN UR ELPH-MCNC: 3.1 GM/DL
GLUCOSE BLDC GLUCOMTR-MCNC: 102 MG/DL (ref 70–130)
GLUCOSE BLDC GLUCOMTR-MCNC: 110 MG/DL (ref 70–130)
GLUCOSE BLDC GLUCOMTR-MCNC: 113 MG/DL (ref 70–130)
GLUCOSE BLDC GLUCOMTR-MCNC: 93 MG/DL (ref 70–130)
GLUCOSE BLDC GLUCOMTR-MCNC: 96 MG/DL (ref 70–130)
GLUCOSE SERPL-MCNC: 102 MG/DL (ref 65–99)
GLUCOSE SERPL-MCNC: 111 MG/DL (ref 65–99)
GLUCOSE UR STRIP-MCNC: NEGATIVE MG/DL
HCT VFR BLD AUTO: 37.4 % (ref 34–46.6)
HGB BLD-MCNC: 12.3 G/DL (ref 12–15.9)
HGB UR QL STRIP.AUTO: NEGATIVE
HYALINE CASTS UR QL AUTO: ABNORMAL /LPF
IMM GRANULOCYTES # BLD AUTO: 0.02 10*3/MM3 (ref 0–0.05)
IMM GRANULOCYTES NFR BLD AUTO: 0.3 % (ref 0–0.5)
INR PPP: 1.98 (ref 0.9–1.1)
KETONES UR QL STRIP: NEGATIVE
LEUKOCYTE ESTERASE UR QL STRIP.AUTO: ABNORMAL
LYMPHOCYTES # BLD AUTO: 1.45 10*3/MM3 (ref 0.7–3.1)
LYMPHOCYTES NFR BLD AUTO: 24.5 % (ref 19.6–45.3)
MCH RBC QN AUTO: 30.3 PG (ref 26.6–33)
MCHC RBC AUTO-ENTMCNC: 32.9 G/DL (ref 31.5–35.7)
MCV RBC AUTO: 92.1 FL (ref 79–97)
MONOCYTES # BLD AUTO: 0.5 10*3/MM3 (ref 0.1–0.9)
MONOCYTES NFR BLD AUTO: 8.4 % (ref 5–12)
NEUTROPHILS NFR BLD AUTO: 3.71 10*3/MM3 (ref 1.7–7)
NEUTROPHILS NFR BLD AUTO: 62.8 % (ref 42.7–76)
NITRITE UR QL STRIP: NEGATIVE
NRBC BLD AUTO-RTO: 0 /100 WBC (ref 0–0.2)
PH UR STRIP.AUTO: 6.5 [PH] (ref 5–8)
PLATELET # BLD AUTO: 213 10*3/MM3 (ref 140–450)
PMV BLD AUTO: 10.1 FL (ref 6–12)
POTASSIUM SERPL-SCNC: 4.6 MMOL/L (ref 3.5–5.2)
POTASSIUM SERPL-SCNC: 4.9 MMOL/L (ref 3.5–5.2)
PROT SERPL-MCNC: 6.4 G/DL (ref 6–8.5)
PROT UR QL STRIP: NEGATIVE
PROTHROMBIN TIME: 22.5 SECONDS (ref 11.7–14.2)
RBC # BLD AUTO: 4.06 10*6/MM3 (ref 3.77–5.28)
RBC # UR STRIP: ABNORMAL /HPF
REF LAB TEST METHOD: ABNORMAL
SODIUM SERPL-SCNC: 142 MMOL/L (ref 136–145)
SODIUM SERPL-SCNC: 151 MMOL/L (ref 136–145)
SP GR UR STRIP: 1.01 (ref 1–1.03)
SQUAMOUS #/AREA URNS HPF: ABNORMAL /HPF
UROBILINOGEN UR QL STRIP: ABNORMAL
WBC # UR STRIP: ABNORMAL /HPF
WBC NRBC COR # BLD: 5.92 10*3/MM3 (ref 3.4–10.8)

## 2022-03-30 PROCEDURE — 85610 PROTHROMBIN TIME: CPT | Performed by: STUDENT IN AN ORGANIZED HEALTH CARE EDUCATION/TRAINING PROGRAM

## 2022-03-30 PROCEDURE — 87086 URINE CULTURE/COLONY COUNT: CPT | Performed by: STUDENT IN AN ORGANIZED HEALTH CARE EDUCATION/TRAINING PROGRAM

## 2022-03-30 PROCEDURE — 92610 EVALUATE SWALLOWING FUNCTION: CPT

## 2022-03-30 PROCEDURE — 80053 COMPREHEN METABOLIC PANEL: CPT | Performed by: INTERNAL MEDICINE

## 2022-03-30 PROCEDURE — 97166 OT EVAL MOD COMPLEX 45 MIN: CPT | Performed by: OCCUPATIONAL THERAPIST

## 2022-03-30 PROCEDURE — 97530 THERAPEUTIC ACTIVITIES: CPT | Performed by: OCCUPATIONAL THERAPIST

## 2022-03-30 PROCEDURE — 82962 GLUCOSE BLOOD TEST: CPT

## 2022-03-30 PROCEDURE — 81001 URINALYSIS AUTO W/SCOPE: CPT | Performed by: STUDENT IN AN ORGANIZED HEALTH CARE EDUCATION/TRAINING PROGRAM

## 2022-03-30 PROCEDURE — 85025 COMPLETE CBC W/AUTO DIFF WBC: CPT | Performed by: INTERNAL MEDICINE

## 2022-03-30 RX ORDER — NITROGLYCERIN 0.4 MG/1
0.4 TABLET SUBLINGUAL
Status: DISCONTINUED | OUTPATIENT
Start: 2022-03-30 | End: 2022-04-03 | Stop reason: HOSPADM

## 2022-03-30 RX ORDER — WARFARIN SODIUM 2.5 MG/1
2.5 TABLET ORAL
Status: DISCONTINUED | OUTPATIENT
Start: 2022-03-31 | End: 2022-04-03 | Stop reason: HOSPADM

## 2022-03-30 RX ORDER — CLONAZEPAM 0.5 MG/1
0.5 TABLET ORAL 4 TIMES DAILY
Status: DISCONTINUED | OUTPATIENT
Start: 2022-03-30 | End: 2022-04-02

## 2022-03-30 RX ORDER — RISPERIDONE 0.25 MG/1
0.25 TABLET ORAL 2 TIMES DAILY
Status: DISCONTINUED | OUTPATIENT
Start: 2022-03-30 | End: 2022-04-03 | Stop reason: HOSPADM

## 2022-03-30 RX ORDER — SOTALOL HYDROCHLORIDE 80 MG/1
40 TABLET ORAL 2 TIMES DAILY
Status: DISCONTINUED | OUTPATIENT
Start: 2022-03-30 | End: 2022-04-03 | Stop reason: HOSPADM

## 2022-03-30 RX ORDER — NYSTATIN 100000 [USP'U]/G
POWDER TOPICAL 2 TIMES DAILY
Status: DISCONTINUED | OUTPATIENT
Start: 2022-03-30 | End: 2022-04-03 | Stop reason: HOSPADM

## 2022-03-30 RX ORDER — ATORVASTATIN CALCIUM 20 MG/1
40 TABLET, FILM COATED ORAL DAILY
Status: DISCONTINUED | OUTPATIENT
Start: 2022-03-30 | End: 2022-04-03 | Stop reason: HOSPADM

## 2022-03-30 RX ORDER — WARFARIN SODIUM 3 MG/1
3 TABLET ORAL
Status: DISCONTINUED | OUTPATIENT
Start: 2022-03-30 | End: 2022-04-03 | Stop reason: HOSPADM

## 2022-03-30 RX ORDER — PANTOPRAZOLE SODIUM 40 MG/1
40 TABLET, DELAYED RELEASE ORAL EVERY MORNING
Status: DISCONTINUED | OUTPATIENT
Start: 2022-03-30 | End: 2022-04-03 | Stop reason: HOSPADM

## 2022-03-30 RX ORDER — DEXTROSE MONOHYDRATE 50 MG/ML
50 INJECTION, SOLUTION INTRAVENOUS CONTINUOUS
Status: DISCONTINUED | OUTPATIENT
Start: 2022-03-30 | End: 2022-03-30

## 2022-03-30 RX ADMIN — PANTOPRAZOLE SODIUM 40 MG: 40 TABLET, DELAYED RELEASE ORAL at 12:39

## 2022-03-30 RX ADMIN — Medication 10 ML: at 10:24

## 2022-03-30 RX ADMIN — CLONAZEPAM 0.5 MG: 0.5 TABLET ORAL at 12:39

## 2022-03-30 RX ADMIN — SODIUM CHLORIDE 125 ML/HR: 9 INJECTION, SOLUTION INTRAVENOUS at 05:02

## 2022-03-30 RX ADMIN — NYSTATIN 1 APPLICATION: 100000 POWDER TOPICAL at 02:00

## 2022-03-30 RX ADMIN — RISPERIDONE 0.25 MG: 0.25 TABLET ORAL at 21:52

## 2022-03-30 RX ADMIN — WARFARIN 3 MG: 3 TABLET ORAL at 17:19

## 2022-03-30 RX ADMIN — NYSTATIN: 100000 POWDER TOPICAL at 21:52

## 2022-03-30 RX ADMIN — ATORVASTATIN CALCIUM 40 MG: 20 TABLET, FILM COATED ORAL at 12:39

## 2022-03-30 RX ADMIN — DEXTROSE MONOHYDRATE 50 ML/HR: 50 INJECTION, SOLUTION INTRAVENOUS at 09:39

## 2022-03-30 RX ADMIN — NYSTATIN: 100000 POWDER TOPICAL at 05:02

## 2022-03-30 RX ADMIN — SOTALOL HYDROCHLORIDE 40 MG: 80 TABLET ORAL at 23:01

## 2022-03-30 RX ADMIN — CLONAZEPAM 0.5 MG: 0.5 TABLET ORAL at 21:52

## 2022-03-30 RX ADMIN — Medication 10 ML: at 21:52

## 2022-03-30 RX ADMIN — NYSTATIN: 100000 POWDER TOPICAL at 12:39

## 2022-03-30 RX ADMIN — SOTALOL HYDROCHLORIDE 40 MG: 80 TABLET ORAL at 12:39

## 2022-03-31 LAB
ANION GAP SERPL CALCULATED.3IONS-SCNC: 8 MMOL/L (ref 5–15)
BACTERIA SPEC AEROBE CULT: NO GROWTH
BASOPHILS # BLD AUTO: 0.02 10*3/MM3 (ref 0–0.2)
BASOPHILS NFR BLD AUTO: 0.2 % (ref 0–1.5)
BUN SERPL-MCNC: 17 MG/DL (ref 8–23)
BUN/CREAT SERPL: 14 (ref 7–25)
CALCIUM SPEC-SCNC: 8.8 MG/DL (ref 8.6–10.5)
CHLORIDE SERPL-SCNC: 108 MMOL/L (ref 98–107)
CO2 SERPL-SCNC: 25 MMOL/L (ref 22–29)
CREAT SERPL-MCNC: 1.21 MG/DL (ref 0.57–1)
DEPRECATED RDW RBC AUTO: 48.1 FL (ref 37–54)
EGFRCR SERPLBLD CKD-EPI 2021: 45.7 ML/MIN/1.73
EOSINOPHIL # BLD AUTO: 0.2 10*3/MM3 (ref 0–0.4)
EOSINOPHIL NFR BLD AUTO: 2.3 % (ref 0.3–6.2)
ERYTHROCYTE [DISTWIDTH] IN BLOOD BY AUTOMATED COUNT: 14.3 % (ref 12.3–15.4)
GLUCOSE BLDC GLUCOMTR-MCNC: 115 MG/DL (ref 70–130)
GLUCOSE BLDC GLUCOMTR-MCNC: 116 MG/DL (ref 70–130)
GLUCOSE BLDC GLUCOMTR-MCNC: 119 MG/DL (ref 70–130)
GLUCOSE BLDC GLUCOMTR-MCNC: 121 MG/DL (ref 70–130)
GLUCOSE SERPL-MCNC: 112 MG/DL (ref 65–99)
HCT VFR BLD AUTO: 38.2 % (ref 34–46.6)
HGB BLD-MCNC: 12.5 G/DL (ref 12–15.9)
IMM GRANULOCYTES # BLD AUTO: 0.03 10*3/MM3 (ref 0–0.05)
IMM GRANULOCYTES NFR BLD AUTO: 0.3 % (ref 0–0.5)
INR PPP: 1.83 (ref 0.9–1.1)
LYMPHOCYTES # BLD AUTO: 1.84 10*3/MM3 (ref 0.7–3.1)
LYMPHOCYTES NFR BLD AUTO: 21.3 % (ref 19.6–45.3)
MCH RBC QN AUTO: 30.4 PG (ref 26.6–33)
MCHC RBC AUTO-ENTMCNC: 32.7 G/DL (ref 31.5–35.7)
MCV RBC AUTO: 92.9 FL (ref 79–97)
MONOCYTES # BLD AUTO: 0.72 10*3/MM3 (ref 0.1–0.9)
MONOCYTES NFR BLD AUTO: 8.3 % (ref 5–12)
NEUTROPHILS NFR BLD AUTO: 5.82 10*3/MM3 (ref 1.7–7)
NEUTROPHILS NFR BLD AUTO: 67.6 % (ref 42.7–76)
NRBC BLD AUTO-RTO: 0 /100 WBC (ref 0–0.2)
PLATELET # BLD AUTO: 210 10*3/MM3 (ref 140–450)
PMV BLD AUTO: 9.8 FL (ref 6–12)
POTASSIUM SERPL-SCNC: 4.7 MMOL/L (ref 3.5–5.2)
PROTHROMBIN TIME: 21.2 SECONDS (ref 11.7–14.2)
QT INTERVAL: 455 MS
RBC # BLD AUTO: 4.11 10*6/MM3 (ref 3.77–5.28)
SODIUM SERPL-SCNC: 141 MMOL/L (ref 136–145)
WBC NRBC COR # BLD: 8.63 10*3/MM3 (ref 3.4–10.8)

## 2022-03-31 PROCEDURE — 93005 ELECTROCARDIOGRAM TRACING: CPT | Performed by: STUDENT IN AN ORGANIZED HEALTH CARE EDUCATION/TRAINING PROGRAM

## 2022-03-31 PROCEDURE — 82962 GLUCOSE BLOOD TEST: CPT

## 2022-03-31 PROCEDURE — 85610 PROTHROMBIN TIME: CPT | Performed by: STUDENT IN AN ORGANIZED HEALTH CARE EDUCATION/TRAINING PROGRAM

## 2022-03-31 PROCEDURE — 93010 ELECTROCARDIOGRAM REPORT: CPT | Performed by: INTERNAL MEDICINE

## 2022-03-31 PROCEDURE — 97110 THERAPEUTIC EXERCISES: CPT

## 2022-03-31 PROCEDURE — 80048 BASIC METABOLIC PNL TOTAL CA: CPT | Performed by: STUDENT IN AN ORGANIZED HEALTH CARE EDUCATION/TRAINING PROGRAM

## 2022-03-31 PROCEDURE — 97162 PT EVAL MOD COMPLEX 30 MIN: CPT

## 2022-03-31 PROCEDURE — 85025 COMPLETE CBC W/AUTO DIFF WBC: CPT | Performed by: STUDENT IN AN ORGANIZED HEALTH CARE EDUCATION/TRAINING PROGRAM

## 2022-03-31 RX ORDER — WARFARIN SODIUM 2 MG/1
2 TABLET ORAL
Status: COMPLETED | OUTPATIENT
Start: 2022-03-31 | End: 2022-03-31

## 2022-03-31 RX ORDER — SODIUM CHLORIDE, SODIUM LACTATE, POTASSIUM CHLORIDE, CALCIUM CHLORIDE 600; 310; 30; 20 MG/100ML; MG/100ML; MG/100ML; MG/100ML
75 INJECTION, SOLUTION INTRAVENOUS CONTINUOUS
Status: DISCONTINUED | OUTPATIENT
Start: 2022-03-31 | End: 2022-04-02

## 2022-03-31 RX ADMIN — SOTALOL HYDROCHLORIDE 40 MG: 80 TABLET ORAL at 09:19

## 2022-03-31 RX ADMIN — SOTALOL HYDROCHLORIDE 40 MG: 80 TABLET ORAL at 21:54

## 2022-03-31 RX ADMIN — RISPERIDONE 0.25 MG: 0.25 TABLET ORAL at 21:55

## 2022-03-31 RX ADMIN — CLONAZEPAM 0.5 MG: 0.5 TABLET ORAL at 09:19

## 2022-03-31 RX ADMIN — NYSTATIN: 100000 POWDER TOPICAL at 09:19

## 2022-03-31 RX ADMIN — CLONAZEPAM 0.5 MG: 0.5 TABLET ORAL at 12:03

## 2022-03-31 RX ADMIN — ATORVASTATIN CALCIUM 40 MG: 20 TABLET, FILM COATED ORAL at 09:19

## 2022-03-31 RX ADMIN — RISPERIDONE 0.25 MG: 0.25 TABLET ORAL at 09:20

## 2022-03-31 RX ADMIN — Medication 10 ML: at 21:56

## 2022-03-31 RX ADMIN — SODIUM CHLORIDE, POTASSIUM CHLORIDE, SODIUM LACTATE AND CALCIUM CHLORIDE 75 ML/HR: 600; 310; 30; 20 INJECTION, SOLUTION INTRAVENOUS at 12:03

## 2022-03-31 RX ADMIN — PANTOPRAZOLE SODIUM 40 MG: 40 TABLET, DELAYED RELEASE ORAL at 06:40

## 2022-03-31 RX ADMIN — WARFARIN 2 MG: 2 TABLET ORAL at 18:16

## 2022-03-31 RX ADMIN — Medication 10 ML: at 09:19

## 2022-03-31 RX ADMIN — WARFARIN 2.5 MG: 2.5 TABLET ORAL at 18:16

## 2022-03-31 RX ADMIN — CLONAZEPAM 0.5 MG: 0.5 TABLET ORAL at 18:15

## 2022-03-31 RX ADMIN — CLONAZEPAM 0.5 MG: 0.5 TABLET ORAL at 21:54

## 2022-03-31 RX ADMIN — NYSTATIN: 100000 POWDER TOPICAL at 21:55

## 2022-04-01 ENCOUNTER — APPOINTMENT (OUTPATIENT)
Dept: GENERAL RADIOLOGY | Facility: HOSPITAL | Age: 82
End: 2022-04-01

## 2022-04-01 PROBLEM — J96.22 ACUTE ON CHRONIC RESPIRATORY FAILURE WITH HYPERCAPNIA: Status: ACTIVE | Noted: 2022-04-01

## 2022-04-01 PROBLEM — G47.33 OSA (OBSTRUCTIVE SLEEP APNEA): Status: ACTIVE | Noted: 2022-04-01

## 2022-04-01 LAB
ANION GAP SERPL CALCULATED.3IONS-SCNC: 6.8 MMOL/L (ref 5–15)
ARTERIAL PATENCY WRIST A: POSITIVE
ATMOSPHERIC PRESS: 749.8 MMHG
BASE EXCESS BLDA CALC-SCNC: 1.4 MMOL/L (ref 0–2)
BDY SITE: ABNORMAL
BUN SERPL-MCNC: 17 MG/DL (ref 8–23)
BUN/CREAT SERPL: 14.8 (ref 7–25)
CALCIUM SPEC-SCNC: 9 MG/DL (ref 8.6–10.5)
CHLORIDE SERPL-SCNC: 109 MMOL/L (ref 98–107)
CO2 SERPL-SCNC: 26.2 MMOL/L (ref 22–29)
CREAT SERPL-MCNC: 1.15 MG/DL (ref 0.57–1)
EGFRCR SERPLBLD CKD-EPI 2021: 48.6 ML/MIN/1.73
GAS FLOW AIRWAY: 6 LPM
GLUCOSE BLDC GLUCOMTR-MCNC: 109 MG/DL (ref 70–130)
GLUCOSE BLDC GLUCOMTR-MCNC: 114 MG/DL (ref 70–130)
GLUCOSE BLDC GLUCOMTR-MCNC: 121 MG/DL (ref 70–130)
GLUCOSE BLDC GLUCOMTR-MCNC: 128 MG/DL (ref 70–130)
GLUCOSE BLDC GLUCOMTR-MCNC: 96 MG/DL (ref 70–130)
GLUCOSE SERPL-MCNC: 105 MG/DL (ref 65–99)
HCO3 BLDA-SCNC: 29.2 MMOL/L (ref 22–28)
INR PPP: 1.99 (ref 0.9–1.1)
MODALITY: ABNORMAL
NT-PROBNP SERPL-MCNC: 3021 PG/ML (ref 0–1800)
PCO2 BLDA: 60.3 MM HG (ref 35–45)
PH BLDA: 7.29 PH UNITS (ref 7.35–7.45)
PO2 BLDA: 194.6 MM HG (ref 80–100)
POTASSIUM SERPL-SCNC: 4.6 MMOL/L (ref 3.5–5.2)
PROCALCITONIN SERPL-MCNC: 0.07 NG/ML (ref 0–0.25)
PROTHROMBIN TIME: 22.6 SECONDS (ref 11.7–14.2)
SAO2 % BLDCOA: 99.5 % (ref 92–99)
SODIUM SERPL-SCNC: 142 MMOL/L (ref 136–145)
TOTAL RATE: 12 BREATHS/MINUTE

## 2022-04-01 PROCEDURE — 97530 THERAPEUTIC ACTIVITIES: CPT

## 2022-04-01 PROCEDURE — 82962 GLUCOSE BLOOD TEST: CPT

## 2022-04-01 PROCEDURE — 83880 ASSAY OF NATRIURETIC PEPTIDE: CPT | Performed by: INTERNAL MEDICINE

## 2022-04-01 PROCEDURE — 84145 PROCALCITONIN (PCT): CPT | Performed by: INTERNAL MEDICINE

## 2022-04-01 PROCEDURE — 85610 PROTHROMBIN TIME: CPT | Performed by: STUDENT IN AN ORGANIZED HEALTH CARE EDUCATION/TRAINING PROGRAM

## 2022-04-01 PROCEDURE — 82803 BLOOD GASES ANY COMBINATION: CPT

## 2022-04-01 PROCEDURE — 36600 WITHDRAWAL OF ARTERIAL BLOOD: CPT

## 2022-04-01 PROCEDURE — 71045 X-RAY EXAM CHEST 1 VIEW: CPT

## 2022-04-01 PROCEDURE — 80048 BASIC METABOLIC PNL TOTAL CA: CPT | Performed by: STUDENT IN AN ORGANIZED HEALTH CARE EDUCATION/TRAINING PROGRAM

## 2022-04-01 RX ADMIN — PANTOPRAZOLE SODIUM 40 MG: 40 TABLET, DELAYED RELEASE ORAL at 06:52

## 2022-04-01 RX ADMIN — SOTALOL HYDROCHLORIDE 40 MG: 80 TABLET ORAL at 22:23

## 2022-04-01 RX ADMIN — RISPERIDONE 0.25 MG: 0.25 TABLET ORAL at 09:59

## 2022-04-01 RX ADMIN — CLONAZEPAM 0.5 MG: 0.5 TABLET ORAL at 09:59

## 2022-04-01 RX ADMIN — NYSTATIN: 100000 POWDER TOPICAL at 22:24

## 2022-04-01 RX ADMIN — WARFARIN 3 MG: 3 TABLET ORAL at 19:03

## 2022-04-01 RX ADMIN — CLONAZEPAM 0.5 MG: 0.5 TABLET ORAL at 19:00

## 2022-04-01 RX ADMIN — SODIUM CHLORIDE, POTASSIUM CHLORIDE, SODIUM LACTATE AND CALCIUM CHLORIDE 75 ML/HR: 600; 310; 30; 20 INJECTION, SOLUTION INTRAVENOUS at 12:25

## 2022-04-01 RX ADMIN — NYSTATIN: 100000 POWDER TOPICAL at 10:01

## 2022-04-01 RX ADMIN — SOTALOL HYDROCHLORIDE 40 MG: 80 TABLET ORAL at 09:59

## 2022-04-01 RX ADMIN — Medication 10 ML: at 10:01

## 2022-04-01 RX ADMIN — Medication 10 ML: at 21:00

## 2022-04-01 RX ADMIN — RISPERIDONE 0.25 MG: 0.25 TABLET ORAL at 22:23

## 2022-04-01 RX ADMIN — ATORVASTATIN CALCIUM 40 MG: 20 TABLET, FILM COATED ORAL at 09:59

## 2022-04-01 NOTE — CASE MANAGEMENT/SOCIAL WORK
Continued Stay Note  [facility]     Patient Name: Tiara Hernandes  MRN: [id number]  Today's Date: 4/1/2022    Admit Date: 3/29/2022     Discharge Plan     Row Name 04/01/22 1114       Plan    Plan Pawel Yoav; pre-cert approved until Daniel 4/3    Plan Comments CCP received call from Lorena/DAMIAN; patient is not medically stable to be discharged today. CCP updated Jessica/Pawel Cason of patient not being discharged today. Per Jessica; patient's pre-cert is good for tomorrow and Sunday. If patient does not admitt over the weekend, patient will need a new pre-cert started on Monday. Leydi LUTZ    Row Name 04/01/22 1011       Plan    Plan Skilled bed at [facility]; [facility] for 4:00 P.M    Patient/Family in Agreement with Plan yes    Plan Comments CCP spoke with Jessica/Pawel Cason; pre-cert approved and bed available today. Per Jessica; patient does not need another COVID test. CCP met with patient's spouse at bedside. Patient's spouse in agreement of discharge to [facility]. IMM letter given. [facility] with [facility] scheduled for 4:00 P.M this afternoon. Lorena/DAMIAN notified and RN updated. Leydi LUTZ               Discharge Codes    No documentation.               Expected Discharge Date and Time     Expected Discharge Date Expected Discharge Time    Apr 1, 2022             NELDA Reilly

## 2022-04-01 NOTE — PROGRESS NOTES
[facility] Clinical Pharmacy Services: Warfarin Dosing/Monitoring Consult    Tiara Hernandes is a 81 y.o. female, estimated creatinine clearance is 43 mL/min (A) (by C-G formula based on SCr of 1.15 mg/dL (H)). weighing 96.6 kg (213 lb).    Results from last 7 days   Lab Units 04/01/22  0628 03/31/22  0734 03/30/22  1224 03/30/22  0755 03/29/22  1127   INR  1.99* 1.83* 1.98*  --  2.37*   APTT seconds  --   --   --   --  32.3   HEMOGLOBIN g/dL  --  12.5  --  12.3 13.0   HEMATOCRIT %  --  38.2  --  37.4 40.1   PLATELETS 10*3/mm3  --  210  --  213 305     Prior to admission anticoagulation: 3mg MWF with 2.5mg TTSS    Hospital Anticoagulation:  Consulting provider: Sarath  Start date: 3/30/22  Indication: Atrial Fibrillation - requiring full anticoagulation  Target INR: 2 - 3  Expected duration: indefinite  Bridge Therapy: no    Potential food or drug interactions: none    Education complete?/Date: No; plan for follow up TBD    Assessment/Plan:  Dose: INR today at 1.99 (almost in target) therefore will resume her home regimen and give her regularly scheduled dose of 3mg today.  Monitor for any signs or symptoms of bleeding  Follow up daily INRs and dose adjustments    Pharmacy will continue to follow until discharge or discontinuation of warfarin.     Gavino Plascencia [facility]  Clinical Pharmacist

## 2022-04-01 NOTE — PLAN OF CARE
Problem: Adult Inpatient Plan of Care  Goal: Absence of Hospital-Acquired Illness or Injury  Intervention: Identify and Manage Fall Risk  Recent Flowsheet Documentation  Taken 4/1/2022 0610 by Nupur Carrillo RN  Safety Promotion/Fall Prevention:  • activity supervised  • safety round/check completed  Taken 4/1/2022 0405 by Nupur Carrillo RN  Safety Promotion/Fall Prevention:  • activity supervised  • safety round/check completed  Taken 4/1/2022 0200 by Nupur Carrillo RN  Safety Promotion/Fall Prevention: activity supervised  Taken 4/1/2022 0010 by Nupur Carrillo RN  Safety Promotion/Fall Prevention:  • activity supervised  • safety round/check completed  Taken 3/31/2022 2206 by Nupur Carrillo RN  Safety Promotion/Fall Prevention:  • activity supervised  • safety round/check completed  Taken 3/31/2022 2000 by Nupur Carrillo RN  Safety Promotion/Fall Prevention:  • activity supervised  • assistive device/personal items within reach  • clutter free environment maintained  • fall prevention program maintained  • lighting adjusted  • nonskid shoes/slippers when out of bed  • room organization consistent  • safety round/check completed     Problem: Adult Inpatient Plan of Care  Goal: Absence of Hospital-Acquired Illness or Injury  Intervention: Prevent and Manage VTE (Venous Thromboembolism) Risk  Recent Flowsheet Documentation  Taken 3/31/2022 2000 by Nupur Carrillo RN  Activity Management: ambulated to bathroom  VTE Prevention/Management:  • bilateral  • sequential compression devices on   Goal Outcome Evaluation:  Plan of Care Reviewed With: patient, significant other        Progress: improving

## 2022-04-01 NOTE — NURSING NOTE
Patient placed back on 7L NC, per RT. Weaning down oxygen.  said he bring patient's CPAP. Pulmonology consulted.

## 2022-04-01 NOTE — PLAN OF CARE
Goal Outcome Evaluation:  Plan of Care Reviewed With: patient        Progress: no change  Outcome Evaluation: Pt seen by OT this date for treatment. Upon arrival pt sitting up in chair, sleeping, able to arouse, A&O x1. Pt performed sit>stand transition with Mod A x2 adn ambulated to bathroom with rolling walker to complete toileting task. Pt very unsafe this date with transfers and demonstrates decrease safety awareness as pt attempting to sit before being in front of commode. Pt Max A for pericare this date in  standing, as pt attempted to complete pericare independently but was unable. Pt then ambulated back to bed but becoming very fatigued requiring increase verbal /tactile cues as pt again attempting to sit before being near bed and getting turned around. Pt requiring Mod A x2 for transfers this date and Max A for sit>sup and dependent for scooting in bed. Pt to continue with skilled OT services to address goals and deficits. OT wore mask, gloves, glasses, hand hygiene performed.

## 2022-04-01 NOTE — PROGRESS NOTES
"DAILY PROGRESS NOTE  [facility]    Patient Identification:  Name: Tiara Hernandes  Age: 81 y.o.  Sex: female  :  1940  MRN: [id number]         Primary Care Physician: Luis Williamson MD    Subjective:  Interval History:She is very weak . Confused and hypoxic today.    Objective:    Scheduled Meds:atorvastatin, 40 mg, Oral, Daily  clonazePAM, 0.5 mg, Oral, 4x Daily  nystatin, , Topical, BID  pantoprazole, 40 mg, Oral, QAM  risperiDONE, 0.25 mg, Oral, BID  sodium chloride, 10 mL, Intravenous, Q12H  sotalol, 40 mg, Oral, BID  warfarin, 2.5 mg, Oral, Once per day on Sun Tue Thu Sat  warfarin, 3 mg, Oral, Once per day on       Continuous Infusions:lactated ringers, 75 mL/hr, Last Rate: 75 mL/hr (22 1203)  Pharmacy to dose warfarin,         Vital signs in last 24 hours:  Temp:  [97.7 °F (36.5 °C)-98.4 °F (36.9 °C)] 97.7 °F (36.5 °C)  Heart Rate:  [59-76] 76  Resp:  [16-21] 21  BP: (110-137)/(51-75) 116/65    Intake/Output:    Intake/Output Summary (Last 24 hours) at 2022 1105  Last data filed at 2022 0853  Gross per 24 hour   Intake 1070 ml   Output --   Net 1070 ml       Exam:  /65 (BP Location: Right arm, Patient Position: Lying)   Pulse 76   Temp 97.7 °F (36.5 °C) (Oral)   Resp 21   Ht 157.5 cm (62\")   Wt 96.6 kg (213 lb)   SpO2 (!) 85% Comment: rapid response called  BMI 38.96 kg/m²     General Appearance:    confused, no distress   Head:    Normocephalic, without obvious abnormality, atraumatic   Eyes:       Throat:   Lips, tongue, gums normal   Neck:   Supple, symmetrical, trachea midline, no JVD   Lungs:     Clear to auscultation bilaterally, respirations unlabored   Chest Wall:    No tenderness or deformity    Heart:    Regular rate and rhythm, S1 and S2 normal, no murmur,no  Rub or gallop   Abdomen:     Soft, nontender, bowel sounds active, no masses, no organomegaly    Extremities:   Extremities normal, atraumatic, no cyanosis or edema   Pulses:    "   Skin:   Skin is warm and dry,  no rashes or palpable lesions   Neurologic:   no focal deficits noted, confused      Lab Results (last 72 hours)     Procedure Component Value Units Date/Time    Blood Gas, Arterial - [[id number]]  (Abnormal) Collected: 04/01/22 1043    Specimen: Arterial Blood Updated: 04/01/22 1047     Site Arterial: right radial     Kleber's Test Positive     pH, Arterial 7.293 pH units      Comment: 1040 nmccomas Meter: [id number] : sancho Taylor NathanCritical:Notify ERIC licea (01-Apr-22 10:46:11)Read back ok        pCO2, Arterial 60.3 mm Hg      Comment: Critical:Notify ERIC licea (01-Apr-22 10:46:11)Read back ok        pO2, Arterial 194.6 mm Hg      HCO3, Arterial 29.2 mmol/L      Base Excess, Arterial 1.4 mmol/L      O2 Saturation Calculated 99.5 %      Barometric Pressure for Blood Gas 749.8 mmHg      Modality Cannula     Flow Rate 6 lpm      Rate 12 Breaths/minute     POC Glucose Once [[id number]]  (Normal) Collected: 04/01/22 1042    Specimen: Blood Updated: 04/01/22 1044     Glucose 128 mg/dL      Comment: Meter: [id number] : [id number] Phil Francis RN       POC Glucose Once [[id number]]  (Normal) Collected: 04/01/22 0748    Specimen: Blood Updated: 04/01/22 0749     Glucose 109 mg/dL      Comment: Meter: [id number] : [id number] Lito Bills CNA       Basic Metabolic Panel [[id number]]  (Abnormal) Collected: 04/01/22 0628    Specimen: Blood Updated: 04/01/22 0734     Glucose 105 mg/dL      BUN 17 mg/dL      Creatinine 1.15 mg/dL      Sodium 142 mmol/L      Potassium 4.6 mmol/L      Chloride 109 mmol/L      CO2 26.2 mmol/L      Calcium 9.0 mg/dL      BUN/Creatinine Ratio 14.8     Anion Gap 6.8 mmol/L      eGFR 48.6 mL/min/1.73      Comment: National Kidney Foundation and American Society of Nephrology (ASN) Task Force recommended calculation based on the Chronic Kidney Disease Epidemiology Collaboration (CKD-EPI) equation refit without adjustment for  race.       Narrative:      GFR Normal >60  Chronic Kidney Disease <60  Kidney Failure <15      Protime-INR [[id number]]  (Abnormal) Collected: 04/01/22 0628    Specimen: Blood Updated: 04/01/22 0725     Protime 22.6 Seconds      INR 1.99    POC Glucose Once [[id number]]  (Normal) Collected: 03/31/22 2112    Specimen: Blood Updated: 03/31/22 2113     Glucose 119 mg/dL      Comment: Meter: [id number] : [id number] Jose Lancasterelle ALVARO       POC Glucose Once [[id number]]  (Normal) Collected: 03/31/22 1734    Specimen: Blood Updated: 03/31/22 1737     Glucose 116 mg/dL      Comment: Meter: [id number] : [id number] Ravindra Kaylynn SHAMAR       Urine Culture - Urine, Urine, Catheter In/Out [[id number]]  (Normal) Collected: 03/30/22 1024    Specimen: Urine, Catheter In/Out Updated: 03/31/22 1432     Urine Culture No growth    POC Glucose Once [[id number]]  (Normal) Collected: 03/31/22 1301    Specimen: Blood Updated: 03/31/22 1302     Glucose 121 mg/dL      Comment: Meter: [id number] : [id number] Ravindra Kaylynn SHAMAR       POC Glucose Once [[id number]]  (Normal) Collected: 03/31/22 0841    Specimen: Blood Updated: 03/31/22 0852     Glucose 115 mg/dL      Comment: Meter: [id number] : [id number] Ravindra Kaylynn SHAMAR       Basic Metabolic Panel [[id number]]  (Abnormal) Collected: 03/31/22 0734    Specimen: Blood Updated: 03/31/22 0816     Glucose 112 mg/dL      BUN 17 mg/dL      Creatinine 1.21 mg/dL      Sodium 141 mmol/L      Potassium 4.7 mmol/L      Chloride 108 mmol/L      CO2 25.0 mmol/L      Calcium 8.8 mg/dL      BUN/Creatinine Ratio 14.0     Anion Gap 8.0 mmol/L      eGFR 45.7 mL/min/1.73      Comment: National Kidney Foundation and American Society of Nephrology (ASN) Task Force recommended calculation based on the Chronic Kidney Disease Epidemiology Collaboration (CKD-EPI) equation refit without adjustment for race.       Narrative:      GFR Normal >60  Chronic Kidney Disease <60  Kidney Failure <15      Protime-INR  [[id number]]  (Abnormal) Collected: 03/31/22 0734    Specimen: Blood from Hand, Right Updated: 03/31/22 0759     Protime 21.2 Seconds      INR 1.83    CBC & Differential [[id number]]  (Normal) Collected: 03/31/22 0734    Specimen: Blood Updated: 03/31/22 0751    Narrative:      The following orders were created for panel order CBC & Differential.  Procedure                               Abnormality         Status                     ---------                               -----------         ------                     CBC Auto Differential[[id number]]        Normal              Final result                 Please view results for these tests on the individual orders.    CBC Auto Differential [[id number]]  (Normal) Collected: 03/31/22 0734    Specimen: Blood Updated: 03/31/22 0751     WBC 8.63 10*3/mm3      RBC 4.11 10*6/mm3      Hemoglobin 12.5 g/dL      Hematocrit 38.2 %      MCV 92.9 fL      MCH 30.4 pg      MCHC 32.7 g/dL      RDW 14.3 %      RDW-SD 48.1 fl      MPV 9.8 fL      Platelets 210 10*3/mm3      Neutrophil % 67.6 %      Lymphocyte % 21.3 %      Monocyte % 8.3 %      Eosinophil % 2.3 %      Basophil % 0.2 %      Immature Grans % 0.3 %      Neutrophils, Absolute 5.82 10*3/mm3      Lymphocytes, Absolute 1.84 10*3/mm3      Monocytes, Absolute 0.72 10*3/mm3      Eosinophils, Absolute 0.20 10*3/mm3      Basophils, Absolute 0.02 10*3/mm3      Immature Grans, Absolute 0.03 10*3/mm3      nRBC 0.0 /100 WBC     POC Glucose Once [[id number]]  (Normal) Collected: 03/30/22 2052    Specimen: Blood Updated: 03/30/22 2053     Glucose 96 mg/dL      Comment: Meter: [id number] : [id number] Jose JOHN       POC Glucose Once [[id number]]  (Normal) Collected: 03/30/22 1643    Specimen: Blood Updated: 03/30/22 1645     Glucose 110 mg/dL      Comment: Meter: [id number] : [id number] Ravindra IBANEZ       POC Glucose Once [[id number]]  (Normal) Collected: 03/30/22 1306    Specimen: Blood Updated: 03/30/22 1328      Glucose 102 mg/dL      Comment: Meter: [id number] : [id number] Ravindra IBANEZ       Basic Metabolic Panel [[id number]]  (Abnormal) Collected: 03/30/22 1224    Specimen: Blood Updated: 03/30/22 1300     Glucose 111 mg/dL      BUN 15 mg/dL      Creatinine 1.04 mg/dL      Sodium 142 mmol/L      Potassium 4.6 mmol/L      Chloride 110 mmol/L      CO2 26.0 mmol/L      Calcium 8.8 mg/dL      BUN/Creatinine Ratio 14.4     Anion Gap 6.0 mmol/L      eGFR 54.8 mL/min/1.73      Comment: National Kidney Foundation and American Society of Nephrology (ASN) Task Force recommended calculation based on the Chronic Kidney Disease Epidemiology Collaboration (CKD-EPI) equation refit without adjustment for race.       Narrative:      GFR Normal >60  Chronic Kidney Disease <60  Kidney Failure <15      Protime-INR [[id number]]  (Abnormal) Collected: 03/30/22 1224    Specimen: Blood Updated: 03/30/22 1243     Protime 22.5 Seconds      INR 1.98    Urinalysis With Culture If Indicated - Urine, Catheter In/Out [[id number]]  (Abnormal) Collected: 03/30/22 1024    Specimen: Urine, Catheter In/Out Updated: 03/30/22 1051     Color, UA Yellow     Appearance, UA Clear     pH, UA 6.5     Specific Gravity, UA 1.014     Glucose, UA Negative     Ketones, UA Negative     Bilirubin, UA Negative     Blood, UA Negative     Protein, UA Negative     Leuk Esterase, UA Trace     Nitrite, UA Negative     Urobilinogen, UA 0.2 E.U./dL    Urinalysis, Microscopic Only - Urine, Catheter In/Out [[id number]]  (Abnormal) Collected: 03/30/22 1024    Specimen: Urine, Catheter In/Out Updated: 03/30/22 1051     RBC, UA 0-2 /HPF      WBC, UA 6-12 /HPF      Bacteria, UA 1+ /HPF      Squamous Epithelial Cells, UA 7-12 /HPF      Hyaline Casts, UA 0-2 /LPF      Methodology Automated Microscopy    Comprehensive Metabolic Panel [[id number]]  (Abnormal) Collected: 03/30/22 0755    Specimen: Blood Updated: 03/30/22 0845     Glucose 102 mg/dL      BUN 16 mg/dL      Creatinine 0.90  mg/dL      Sodium 151 mmol/L      Potassium 4.9 mmol/L      Chloride 107 mmol/L      CO2 26.1 mmol/L      Calcium 8.8 mg/dL      Total Protein 6.4 g/dL      Albumin 3.30 g/dL      ALT (SGPT) 22 U/L      AST (SGOT) 18 U/L      Alkaline Phosphatase 61 U/L      Total Bilirubin 0.5 mg/dL      Globulin 3.1 gm/dL      A/G Ratio 1.1 g/dL      BUN/Creatinine Ratio 17.8     Anion Gap 17.9 mmol/L      eGFR 65.2 mL/min/1.73      Comment: National Kidney Foundation and American Society of Nephrology (ASN) Task Force recommended calculation based on the Chronic Kidney Disease Epidemiology Collaboration (CKD-EPI) equation refit without adjustment for race.       Narrative:      GFR Normal >60  Chronic Kidney Disease <60  Kidney Failure <15      POC Glucose Once [[id number]]  (Normal) Collected: 03/30/22 0814    Specimen: Blood Updated: 03/30/22 0833     Glucose 93 mg/dL      Comment: Meter: [id number] : [id number] Ravindra IBANEZ       CBC Auto Differential [[id number]]  (Normal) Collected: 03/30/22 0755    Specimen: Blood Updated: 03/30/22 0829     WBC 5.92 10*3/mm3      RBC 4.06 10*6/mm3      Hemoglobin 12.3 g/dL      Hematocrit 37.4 %      MCV 92.1 fL      MCH 30.3 pg      MCHC 32.9 g/dL      RDW 14.5 %      RDW-SD 48.6 fl      MPV 10.1 fL      Platelets 213 10*3/mm3      Neutrophil % 62.8 %      Lymphocyte % 24.5 %      Monocyte % 8.4 %      Eosinophil % 3.7 %      Basophil % 0.3 %      Immature Grans % 0.3 %      Neutrophils, Absolute 3.71 10*3/mm3      Lymphocytes, Absolute 1.45 10*3/mm3      Monocytes, Absolute 0.50 10*3/mm3      Eosinophils, Absolute 0.22 10*3/mm3      Basophils, Absolute 0.02 10*3/mm3      Immature Grans, Absolute 0.02 10*3/mm3      nRBC 0.0 /100 WBC     POC Glucose Once [[id number]]  (Normal) Collected: 03/30/22 0643    Specimen: Blood Updated: 03/30/22 0644     Glucose 113 mg/dL      Comment: Meter: [id number] : [id number] Abena JOHN       [place] Draw [[id number]] Collected: 03/29/22  1127    Specimen: Blood Updated: 03/29/22 1333    Narrative:      The following orders were created for panel order [place] Draw.  Procedure                               Abnormality         Status                     ---------                               -----------         ------                     Green Top (Gel)[[id number]]                                  Final result               Lavender Top[[id number]]                                     Final result               Gold Top - SST[[id number]]                                   Final result               Light Blue Top[[id number]]                                   Final result                 Please view results for these tests on the individual orders.    Gold Top - SST [[id number]] Collected: 03/29/22 1224    Specimen: Blood Updated: 03/29/22 1333     Extra Tube Hold for add-ons.     Comment: Auto resulted.       BNP [[id number]]  (Normal) Collected: 03/29/22 1225    Specimen: Blood Updated: 03/29/22 1326     proBNP 735.0 pg/mL     Narrative:      Among patients with dyspnea, NT-proBNP is highly sensitive for the detection of acute congestive heart failure. In addition NT-proBNP of <300 pg/ml effectively rules out acute congestive heart failure with 99% negative predictive value.    Results may be falsely decreased if patient taking Biotin.      Comprehensive Metabolic Panel [[id number]]  (Abnormal) Collected: 03/29/22 1225    Specimen: Blood Updated: 03/29/22 1259     Glucose 99 mg/dL      BUN 19 mg/dL      Creatinine 1.25 mg/dL      Sodium 139 mmol/L      Potassium 5.6 mmol/L      Chloride 105 mmol/L      CO2 27.0 mmol/L      Calcium 9.1 mg/dL      Total Protein 6.8 g/dL      Albumin 3.90 g/dL      ALT (SGPT) 12 U/L      AST (SGOT) 18 U/L      Alkaline Phosphatase 65 U/L      Total Bilirubin 0.4 mg/dL      Globulin 2.9 gm/dL      A/G Ratio 1.3 g/dL      BUN/Creatinine Ratio 15.2     Anion Gap 7.0 mmol/L      eGFR 43.9 mL/min/1.73      Comment: National Kidney  Foundation and American Society of Nephrology (ASN) Task Force recommended calculation based on the Chronic Kidney Disease Epidemiology Collaboration (CKD-EPI) equation refit without adjustment for race.       Narrative:      GFR Normal >60  Chronic Kidney Disease <60  Kidney Failure <15      COVID PRE-OP / PRE-PROCEDURE SCREENING ORDER (NO ISOLATION) - Swab, Nasopharynx [[id number]]  (Normal) Collected: 03/29/22 1207    Specimen: Swab from Nasopharynx Updated: 03/29/22 1235    Narrative:      The following orders were created for panel order COVID PRE-OP / PRE-PROCEDURE SCREENING ORDER (NO ISOLATION) - Swab, Nasopharynx.  Procedure                               Abnormality         Status                     ---------                               -----------         ------                     COVID-19,[facility] IN-HOUSE...[[id number]]  Normal              Final result                 Please view results for these tests on the individual orders.    COVID-19,[facility] IN-HOUSE CEPHEID/LILY NP SWAB IN TRANSPORT MEDIA 8-12 HR TAT - Swab, Nasopharynx [[id number]]  (Normal) Collected: 03/29/22 1207    Specimen: Swab from Nasopharynx Updated: 03/29/22 1235     COVID19 Not Detected    Narrative:      Fact sheet for providers: https://www.fda.gov/media/421801/download    Fact sheet for patients: https://www.fda.gov/media/021731/download    Test performed by PCR.    Lavender Top [[id number]] Collected: 03/29/22 1127    Specimen: Blood Updated: 03/29/22 1233     Extra Tube hold for add-on     Comment: Auto resulted       Green Top (Gel) [[id number]] Collected: 03/29/22 1127    Specimen: Blood Updated: 03/29/22 1233     Extra Tube Hold for add-ons.     Comment: Auto resulted.       Light Blue Top [[id number]] Collected: 03/29/22 1127    Specimen: Blood Updated: 03/29/22 1233     Extra Tube hold for add-on     Comment: Auto resulted       Troponin [[id number]]  (Normal) Collected: 03/29/22 1127    Specimen: Blood Updated: 03/29/22 1152      Troponin T <0.010 ng/mL     Narrative:      Troponin T Reference Range:  <= 0.03 ng/mL-   Negative for AMI  >0.03 ng/mL-     Abnormal for myocardial necrosis.  Clinicians would have to utilize clinical acumen, EKG, Troponin and serial changes to determine if it is an Acute Myocardial Infarction or myocardial injury due to an underlying chronic condition.       Results may be falsely decreased if patient taking Biotin.      aPTT [[id number]]  (Normal) Collected: 03/29/22 1127    Specimen: Blood Updated: 03/29/22 1143     PTT 32.3 seconds     Protime-INR [[id number]]  (Abnormal) Collected: 03/29/22 1127    Specimen: Blood Updated: 03/29/22 1143     Protime 25.9 Seconds      INR 2.37    CBC & Differential [[id number]]  (Normal) Collected: 03/29/22 1127    Specimen: Blood Updated: 03/29/22 1133    Narrative:      The following orders were created for panel order CBC & Differential.  Procedure                               Abnormality         Status                     ---------                               -----------         ------                     CBC Auto Differential[[id number]]        Normal              Final result                 Please view results for these tests on the individual orders.    CBC Auto Differential [[id number]]  (Normal) Collected: 03/29/22 1127    Specimen: Blood Updated: 03/29/22 1133     WBC 7.04 10*3/mm3      RBC 4.25 10*6/mm3      Hemoglobin 13.0 g/dL      Hematocrit 40.1 %      MCV 94.4 fL      MCH 30.6 pg      MCHC 32.4 g/dL      RDW 14.7 %      RDW-SD 50.8 fl      MPV 11.1 fL      Platelets 305 10*3/mm3      Neutrophil % 59.6 %      Lymphocyte % 28.0 %      Monocyte % 8.1 %      Eosinophil % 3.7 %      Basophil % 0.3 %      Immature Grans % 0.3 %      Neutrophils, Absolute 4.20 10*3/mm3      Lymphocytes, Absolute 1.97 10*3/mm3      Monocytes, Absolute 0.57 10*3/mm3      Eosinophils, Absolute 0.26 10*3/mm3      Basophils, Absolute 0.02 10*3/mm3      Immature Grans, Absolute 0.02 10*3/mm3       nRBC 0.0 /100 WBC         Data Review:  Results from last 7 days   Lab Units 04/01/22  0628 03/31/22  0734 03/30/22  1224   SODIUM mmol/L 142 141 142   POTASSIUM mmol/L 4.6 4.7 4.6   CHLORIDE mmol/L 109* 108* 110*   CO2 mmol/L 26.2 25.0 26.0   BUN mg/dL 17 17 15   CREATININE mg/dL 1.15* 1.21* 1.04*   GLUCOSE mg/dL 105* 112* 111*   CALCIUM mg/dL 9.0 8.8 8.8     Results from last 7 days   Lab Units 03/31/22  0734 03/30/22  0755 03/29/22  1127   WBC 10*3/mm3 8.63 5.92 7.04   HEMOGLOBIN g/dL 12.5 12.3 13.0   HEMATOCRIT % 38.2 37.4 40.1   PLATELETS 10*3/mm3 210 213 305             Lab Results   Lab Value Date/Time    TROPONINT <0.010 03/29/2022 1127    TROPONINT <0.010 08/22/2019 1957         Results from last 7 days   Lab Units 03/30/22  0755 03/29/22  1225   ALK PHOS U/L 61 65   BILIRUBIN mg/dL 0.5 0.4   ALT (SGPT) U/L 22 12   AST (SGOT) U/L 18 18             Glucose   Date/Time Value Ref Range Status   04/01/2022 1042 128 70 - 130 mg/dL Final     Comment:     Meter: [id number] : [id number] Phil Francis RN   04/01/2022 0748 109 70 - 130 mg/dL Final     Comment:     Meter: [id number] : [id number] iLto Bills CNA   03/31/2022 2112 119 70 - 130 mg/dL Final     Comment:     Meter: [id number] : [id number] Jose JOHN   03/31/2022 1734 116 70 - 130 mg/dL Final     Comment:     Meter: [id number] : [id number] Ravindra Kaylynn SHAMAR   03/31/2022 1301 121 70 - 130 mg/dL Final     Comment:     Meter: [id number] : [id number] Ravindra Kaylynn SHAMAR   03/31/2022 0841 115 70 - 130 mg/dL Final     Comment:     Meter: [id number] : [id number] Ravindracara Chaidez SHAMAR   03/30/2022 2052 96 70 - 130 mg/dL Final     Comment:     Meter: [id number] : [id number] Jose Graff NA   03/30/2022 1643 110 70 - 130 mg/dL Final     Comment:     Meter: [id number] : [id number] Ravindra Kaylynn SHAMAR     Results from last 7 days   Lab Units 04/01/22  0628 03/31/22  0734 03/30/22  1224   INR  1.99* 1.83* 1.98*       Past  Medical History:   Diagnosis Date   • Anxiety    • Atherosclerotic heart disease of native coronary artery without angina pectoris    • Cardiac pacemaker    • Chronic kidney disease, stage 3 (HCC)    • Dementia without behavioral disturbance (HCC)    • Hyperlipidemia    • Major depressive disorder    • Malignant neoplasm of left breast (HCC)    • Obesity    • Portal vein thrombosis    • Type 2 diabetes mellitus (HCC)        Assessment:  Active Hospital Problems    Diagnosis  POA   • **Altered mental status [R41.82]  Yes   • KAIA (obstructive sleep apnea) [G47.33]  Unknown   • Acute on chronic respiratory failure with hypercapnia (HCC) [J96.22]  Unknown   • Type 2 diabetes mellitus (HCC) [E11.9]  Yes   • Atherosclerotic heart disease of native coronary artery without angina pectoris [I25.10]  Yes   • Chronic kidney disease, stage 3 (HCC) [N18.30]  Yes   • Sick sinus syndrome (HCC) [I49.5]  Yes   • Hypernatremia [E87.0]  Clinically Undetermined   • HTN (hypertension) [I10]  Yes   • DNR (do not resuscitate) [Z66]  Yes   • Dementia (HCC) [F03.90]  Yes   • History of breast cancer [Z85.3]  Not Applicable   • Chronic anticoagulation [Z79.01]  Not Applicable   • Hyperkalemia [E87.5]  Yes      Resolved Hospital Problems   No resolved problems to display.       Plan:  Will ask pulmonary to see. ? CPAP or BIPAP??   She has not been able to tolerate her home CPAP.  Check CXR.  Asked her  to bring her machine from home.    Alexandre Ramsey MD  4/1/2022  11:05 EDT

## 2022-04-01 NOTE — THERAPY TREATMENT NOTE
Patient Name: Tiara Hernandes  : 1940    MRN: [id number]                              Today's Date: 2022       Admit Date: 3/29/2022    Visit Dx:     ICD-10-CM ICD-9-CM   1. Cerebrovascular accident (CVA), unspecified mechanism (HCC)  I63.9 434.91   2. Altered mental status, unspecified altered mental status type  R41.82 780.97   3. Hyperkalemia  E87.5 276.7   4. Dementia with behavioral disturbance, unspecified dementia type (HCC)  F03.91 294.21   5. Aspiration into airway, initial encounter  T17.908A 934.9   6. Warfarin-induced coagulopathy (HCC)  D68.32 286.7    T45.515A E934.2     Patient Active Problem List   Diagnosis   • HTN (hypertension)   • DNR (do not resuscitate)   • Dementia (HCC)   • History of breast cancer   • Chronic anticoagulation   • Hyperkalemia   • Altered mental status   • Type 2 diabetes mellitus (HCC)   • Atherosclerotic heart disease of native coronary artery without angina pectoris   • Chronic kidney disease, stage 3 (HCC)   • Sick sinus syndrome (HCC)   • Hypernatremia   • KAIA (obstructive sleep apnea)   • Acute on chronic respiratory failure with hypercapnia (HCC)     Past Medical History:   Diagnosis Date   • Anxiety    • Atherosclerotic heart disease of native coronary artery without angina pectoris    • Cardiac pacemaker    • Chronic kidney disease, stage 3 (HCC)    • Dementia without behavioral disturbance (HCC)    • Hyperlipidemia    • Major depressive disorder    • Malignant neoplasm of left breast (HCC)    • Obesity    • Portal vein thrombosis    • Type 2 diabetes mellitus (HCC)      History reviewed. No pertinent surgical history.   General Information     Row Name 22 1521          OT Time and Intention    Document Type therapy note (daily note)  -BL     Mode of Treatment individual therapy;occupational therapy  -BL     Row Name 22 1521          General Information    Patient Profile Reviewed yes  -BL     Existing Precautions/Restrictions fall;oxygen  therapy device and L/min  -     Row Name 04/01/22 1521          Cognition    Orientation Status (Cognition) oriented to;person  -     Row Name 04/01/22 1521          Safety Issues, Functional Mobility    Safety Issues Affecting Function (Mobility) insight into deficits/self-awareness;awareness of need for assistance;judgment;positioning of assistive device;safety precaution awareness;safety precautions follow-through/compliance;sequencing abilities  -     Impairments Affecting Function (Mobility) balance;cognition;endurance/activity tolerance;strength;postural/trunk control  -     Cognitive Impairments, Mobility Safety/Performance awareness, need for assistance;insight into deficits/self-awareness;judgment;problem-solving/reasoning;safety precaution awareness;sequencing abilities  -           User Key  (r) = Recorded By, (t) = Taken By, (c) = Cosigned By    Initials Name Provider Type     Miladys Robles OT Occupational Therapist                 Mobility/ADL's     Row Name 04/01/22 1522          Bed Mobility    Bed Mobility sit-supine;scooting/bridging  -     Scooting/Bridging [place] (Bed Mobility) dependent (less than 25% patient effort);2 person assist;verbal cues;nonverbal cues (demo/gesture)  -     Sit-Supine [place] (Bed Mobility) maximum assist (25% patient effort);2 person assist;verbal cues;nonverbal cues (demo/gesture)  -     Row Name 04/01/22 1522          Transfers    Transfers sit-stand transfer;stand-sit transfer;toilet transfer;chair-bed transfer  -     Chair-Bed [place] (Transfers) moderate assist (50% patient effort);2 person assist;verbal cues;nonverbal cues (demo/gesture)  -     Sit-Stand [place] (Transfers) moderate assist (50% patient effort);2 person assist;verbal cues;nonverbal cues (demo/gesture)  -     Stand-Sit [place] (Transfers) minimum assist (75% patient effort);verbal cues;nonverbal cues (demo/gesture)  -     [place] Level  (Toilet Transfer) minimum assist (75% patient effort);verbal cues;nonverbal cues (demo/gesture)  -BL     Assistive Device (Toilet Transfer) walker, front-wheeled;commode  -BL     Row Name 04/01/22 1522          Sit-Stand Transfer    Assistive Device (Sit-Stand Transfers) walker, front-wheeled  -BL     Row Name 04/01/22 1522          Toilet Transfer    Type (Toilet Transfer) sit-stand;stand-sit  -BL     Row Name 04/01/22 1522          Activities of Daily Living    BADL Assessment/Intervention toileting  -BL     Row Name 04/01/22 1522          Stand-Sit Transfer    Assistive Device (Stand-Sit Transfers) walker, front-wheeled  -BL     Row Name 04/01/22 1522          Chair-Bed Transfer    Assistive Device (Chair-Bed Transfers) walker, front-wheeled  -BL     Row Name 04/01/22 1522          Toileting Assessment/Training    [place] Level (Toileting) toileting skills;maximum assist (25% patient effort);verbal cues  -     Assistive Devices (Toileting) commode  -BL     Position (Toileting) supported standing  -BL           User Key  (r) = Recorded By, (t) = Taken By, (c) = Cosigned By    Initials Name Provider Type    Miladys Magana, OT Occupational Therapist               Obj/Interventions    No documentation.                Goals/Plan    No documentation.                Clinical Impression     Row Name 04/01/22 1524          Pain Assessment    Pretreatment Pain Rating 0/10 - no pain  -BL     Posttreatment Pain Rating 0/10 - no pain  -BL     Row Name 04/01/22 1524          Plan of Care Review    Plan of Care Reviewed With patient  -BL     Progress no change  -BL     Outcome Evaluation Pt seen by OT this date for treatment. Upon arrival pt sitting up in chair, sleeping, able to arouse, A&O x1. Pt performed sit>stand transition with Mod A x2 adn ambulated to bathroom with rolling walker to complete toileting task. Pt very unsafe this date with transfers and demonstrates decrease safety awareness as pt attempting to  sit before being in front of commode. Pt Max A for pericare this date in  standing, as pt attempted to complete pericare independently but was unable. Pt then ambulated back to bed but becoming very fatigued requiring increase verbal /tactile cues as pt again attempting to sit before being near bed and getting turned around. Pt requiring Mod A x2 for transfers this date and Max A for sit>sup and dependent for scooting in bed. Pt to continue with skilled OT services to address goals and deficits. OT wore mask, gloves, glasses, hand hygiene performed.  -BL     Row Name 04/01/22 1524          Vital Signs    Pre Patient Position Sitting  -BL     Intra Patient Position Standing  -BL     Post Patient Position Supine  -BL     Row Name 04/01/22 1524          Positioning and Restraints    Pre-Treatment Position sitting in chair/recliner  -BL     Post Treatment Position bed  -BL     In Bed supine;call light within reach;encouraged to call for assist;exit alarm on;with family/caregiver  -BL           User Key  (r) = Recorded By, (t) = Taken By, (c) = Cosigned By    Initials Name Provider Type    Miladys Magana OT Occupational Therapist               Outcome Measures    No documentation.                 Occupational Therapy Education                 Title: PT OT SLP Therapies (In Progress)     Topic: Occupational Therapy (In Progress)     Point: ADL training (Done)     Description:   Instruct learner(s) on proper safety adaptation and remediation techniques during self care or transfers.   Instruct in proper use of assistive devices.              Learning Progress Summary           Patient Acceptance, E,TB, VU by SG at 3/30/2022 1534    Comment: OT role and goals                   Point: Home exercise program (Not Started)     Description:   Instruct learner(s) on appropriate technique for monitoring, assisting and/or progressing therapeutic exercises/activities.              Learner Progress:  Not documented in this  visit.          Point: Precautions (Not Started)     Description:   Instruct learner(s) on prescribed precautions during self-care and functional transfers.              Learner Progress:  Not documented in this visit.          Point: Body mechanics (Not Started)     Description:   Instruct learner(s) on proper positioning and spine alignment during self-care, functional mobility activities and/or exercises.              Learner Progress:  Not documented in this visit.                      User Key     Initials Effective Dates Name Provider Type Discipline     06/16/21 -  Mee Fuentes OTR Occupational Therapist OT              OT Recommendation and Plan     Plan of Care Review  Plan of Care Reviewed With: patient  Progress: no change  Outcome Evaluation: Pt seen by OT this date for treatment. Upon arrival pt sitting up in chair, sleeping, able to arouse, A&O x1. Pt performed sit>stand transition with Mod A x2 adn ambulated to bathroom with rolling walker to complete toileting task. Pt very unsafe this date with transfers and demonstrates decrease safety awareness as pt attempting to sit before being in front of commode. Pt Max A for pericare this date in  standing, as pt attempted to complete pericare independently but was unable. Pt then ambulated back to bed but becoming very fatigued requiring increase verbal /tactile cues as pt again attempting to sit before being near bed and getting turned around. Pt requiring Mod A x2 for transfers this date and Max A for sit>sup and dependent for scooting in bed. Pt to continue with skilled OT services to address goals and deficits. OT wore mask, gloves, glasses, hand hygiene performed.     Time Calculation:    Time Calculation- OT     Row Name 04/01/22 1532             Time Calculation- OT    OT Start Time 1321  -BL      OT Stop Time 1337  -BL      OT Time Calculation (min) 16 min  -BL      Total Timed Code Minutes- OT 16 minute(s)  -BL      OT Received On 04/01/22   -BL      OT - Next Appointment 04/04/22  -BL              Timed Charges    29130 - OT Therapeutic Activity Minutes 16  -BL              Total Minutes    Timed Charges Total Minutes 16  -BL       Total Minutes 16  -BL            User Key  (r) = Recorded By, (t) = Taken By, (c) = Cosigned By    Initials Name Provider Type    Miladys Magana OT Occupational Therapist              Therapy Charges for Today     Code Description Service Date Service Provider Modifiers Qty    [id number] HC OT THERAPEUTIC ACT EA 15 MIN 4/1/2022 Miladys Robles OT GO 1               Miladys Robles OT  4/1/2022

## 2022-04-01 NOTE — NURSING NOTE
Patient oxygen desaturating to the low 80s. Oxygen increased from 2L to 4L, monitoring patient. Called RT and paged Dr. Ramsey.    Received an order for CXR stat, and ABGs.    Charge nurse responded, rapid response  Team called.    Patient drowsy, complained of throat pain, and was coughing earlier this morning.    RT placed patient on Non rebreather mask at  15L, 02 saturation still in the low 80s. (81%)      Waiting for the rapid response team.

## 2022-04-01 NOTE — CONSULTS
[facility]    Reason for consult: Acute on chronic hypercapnic respiratory failure    HPI:  Mrs. Hernandes is a 82yo female with dementia, CAD s/p CABG, and KAIA.  Her  reports she has been non compliant with her pap therapy at the nursing home and was she was brought here for wanting to sleep all the time and not acting herself.  She says she is not short of breath and doesn't have a cough but she says she doesn't feel well in general.  History is somewhat limited by her dementia.  Her  and the chart provide most of the history.  She is just now putting on her home cpap.  A recent ABG shows some acidemia and hypercapnia.  She does arouse pretty easily to voice and follow commands.    Past Medical History:   Diagnosis Date   • Anxiety    • Atherosclerotic heart disease of native coronary artery without angina pectoris    • Cardiac pacemaker    • Chronic kidney disease, stage 3 (HCC)    • Dementia without behavioral disturbance (HCC)    • Hyperlipidemia    • Major depressive disorder    • Malignant neoplasm of left breast (HCC)    • Obesity    • Portal vein thrombosis    • Type 2 diabetes mellitus (HCC)      Social History     Socioeconomic History   • Marital status:    Tobacco Use   • Smoking status: Former Smoker   Substance and Sexual Activity   • Alcohol use: Never   • Drug use: Never   • Sexual activity: Defer     History reviewed. No pertinent family history.  MEDS: reviewed as per chart  ALL: nkda  ROS: 12 point negative except as in HPI    Vital Sign Min/Max for last 24 hours  Temp  Min: 97.4 °F (36.3 °C)  Max: 98.3 °F (36.8 °C)   BP  Min: 116/65  Max: 137/75   Pulse  Min: 59  Max: 76   Resp  Min: 16  Max: 21   SpO2  Min: 82 %  Max: 99 %   Flow (L/min)  Min: 2  Max: 15   No data recorded     GEN:  NAD, appears ill, obese, AxOx2  HEENT: PERRL, EOMI, no icterus, mmm, no jvd, trachea midline, neck supple  CHEST: decreased ae bilat, no wheezes, no crackles, no use of accessory  muscles  CV: RRR, no m/g/r  ABD: soft, nt, nd +bs, no hepatosplenomegaly  EXT: no c/c/ trace edema bilaterally  SKIN: no rashes, no xanthomas, nl turgor, warm, dry  LYMPH: no palpable cervical or supraclavicular lymphadenopathy  NEURO: CN 2-12 intact and symmetric bilaterally  PSYCH: nl affect, nl orientation, nl judgement, nl mood  MSK: some slight kyphosis, 5/5 strength ue and le bilaterally    Labs: 4/1: reviewed:  7.29/60/194  inr 1.99  Glucose 105  Bun 17  Cr 1.15  Bicarb 26  3/31:  Wbc 8.6  hgb 12.5  plts 210  3/29:  bnp 735    4/1: CXR: reviewed: lower lung volumes s/p CABG otherwise pretty unremarkable.    A/P:  1. Acute hypercapnic respiratory failure -- suspect due to KAIA, OHV, atelectasis -- trial her home cpap and repeat gas as she appears not greatly affected at this point, she may need to use NIPPV instead we will see, avoid hyperoxia  2. KAIA - needs better compliance with pap  3. Atelectasis  4. Possibly OHV  5. Obesity  6. demenia   7. dNR

## 2022-04-02 LAB
ANION GAP SERPL CALCULATED.3IONS-SCNC: 10 MMOL/L (ref 5–15)
ARTERIAL PATENCY WRIST A: POSITIVE
ATMOSPHERIC PRESS: 756.7 MMHG
BASE EXCESS BLDA CALC-SCNC: 1.9 MMOL/L (ref 0–2)
BASOPHILS # BLD AUTO: 0.03 10*3/MM3 (ref 0–0.2)
BASOPHILS NFR BLD AUTO: 0.4 % (ref 0–1.5)
BDY SITE: ABNORMAL
BUN SERPL-MCNC: 14 MG/DL (ref 8–23)
BUN/CREAT SERPL: 12.6 (ref 7–25)
CALCIUM SPEC-SCNC: 8.9 MG/DL (ref 8.6–10.5)
CHLORIDE SERPL-SCNC: 108 MMOL/L (ref 98–107)
CO2 SERPL-SCNC: 24 MMOL/L (ref 22–29)
CREAT SERPL-MCNC: 1.11 MG/DL (ref 0.57–1)
DEPRECATED RDW RBC AUTO: 49.4 FL (ref 37–54)
EGFRCR SERPLBLD CKD-EPI 2021: 50.7 ML/MIN/1.73
EOSINOPHIL # BLD AUTO: 0.21 10*3/MM3 (ref 0–0.4)
EOSINOPHIL NFR BLD AUTO: 2.6 % (ref 0.3–6.2)
ERYTHROCYTE [DISTWIDTH] IN BLOOD BY AUTOMATED COUNT: 14.2 % (ref 12.3–15.4)
GAS FLOW AIRWAY: 5 LPM
GLUCOSE BLDC GLUCOMTR-MCNC: 109 MG/DL (ref 70–130)
GLUCOSE BLDC GLUCOMTR-MCNC: 90 MG/DL (ref 70–130)
GLUCOSE BLDC GLUCOMTR-MCNC: 91 MG/DL (ref 70–130)
GLUCOSE SERPL-MCNC: 106 MG/DL (ref 65–99)
HCO3 BLDA-SCNC: 28.5 MMOL/L (ref 22–28)
HCT VFR BLD AUTO: 40.8 % (ref 34–46.6)
HGB BLD-MCNC: 12.8 G/DL (ref 12–15.9)
IMM GRANULOCYTES # BLD AUTO: 0.04 10*3/MM3 (ref 0–0.05)
IMM GRANULOCYTES NFR BLD AUTO: 0.5 % (ref 0–0.5)
INR PPP: 1.94 (ref 0.9–1.1)
LYMPHOCYTES # BLD AUTO: 1.96 10*3/MM3 (ref 0.7–3.1)
LYMPHOCYTES NFR BLD AUTO: 24.1 % (ref 19.6–45.3)
MCH RBC QN AUTO: 30 PG (ref 26.6–33)
MCHC RBC AUTO-ENTMCNC: 31.4 G/DL (ref 31.5–35.7)
MCV RBC AUTO: 95.8 FL (ref 79–97)
MODALITY: ABNORMAL
MONOCYTES # BLD AUTO: 0.85 10*3/MM3 (ref 0.1–0.9)
MONOCYTES NFR BLD AUTO: 10.4 % (ref 5–12)
NEUTROPHILS NFR BLD AUTO: 5.05 10*3/MM3 (ref 1.7–7)
NEUTROPHILS NFR BLD AUTO: 62 % (ref 42.7–76)
NRBC BLD AUTO-RTO: 0 /100 WBC (ref 0–0.2)
PCO2 BLDA: 52.4 MM HG (ref 35–45)
PEEP RESPIRATORY: 10 CM[H2O]
PH BLDA: 7.34 PH UNITS (ref 7.35–7.45)
PLATELET # BLD AUTO: 201 10*3/MM3 (ref 140–450)
PMV BLD AUTO: 10 FL (ref 6–12)
PO2 BLDA: 84.9 MM HG (ref 80–100)
POTASSIUM SERPL-SCNC: 4.7 MMOL/L (ref 3.5–5.2)
PROTHROMBIN TIME: 22.2 SECONDS (ref 11.7–14.2)
RBC # BLD AUTO: 4.26 10*6/MM3 (ref 3.77–5.28)
SAO2 % BLDCOA: 95.6 % (ref 92–99)
SET MECH RESP RATE: 16
SODIUM SERPL-SCNC: 142 MMOL/L (ref 136–145)
WBC NRBC COR # BLD: 8.14 10*3/MM3 (ref 3.4–10.8)

## 2022-04-02 PROCEDURE — 85025 COMPLETE CBC W/AUTO DIFF WBC: CPT | Performed by: HOSPITALIST

## 2022-04-02 PROCEDURE — 94799 UNLISTED PULMONARY SVC/PX: CPT

## 2022-04-02 PROCEDURE — 82803 BLOOD GASES ANY COMBINATION: CPT

## 2022-04-02 PROCEDURE — 85610 PROTHROMBIN TIME: CPT | Performed by: STUDENT IN AN ORGANIZED HEALTH CARE EDUCATION/TRAINING PROGRAM

## 2022-04-02 PROCEDURE — 80048 BASIC METABOLIC PNL TOTAL CA: CPT | Performed by: HOSPITALIST

## 2022-04-02 PROCEDURE — 94660 CPAP INITIATION&MGMT: CPT

## 2022-04-02 PROCEDURE — 97530 THERAPEUTIC ACTIVITIES: CPT

## 2022-04-02 PROCEDURE — 82962 GLUCOSE BLOOD TEST: CPT

## 2022-04-02 PROCEDURE — 25010000002 FUROSEMIDE PER 20 MG: Performed by: INTERNAL MEDICINE

## 2022-04-02 PROCEDURE — 36600 WITHDRAWAL OF ARTERIAL BLOOD: CPT

## 2022-04-02 RX ORDER — FUROSEMIDE 10 MG/ML
40 INJECTION INTRAMUSCULAR; INTRAVENOUS ONCE
Status: COMPLETED | OUTPATIENT
Start: 2022-04-02 | End: 2022-04-02

## 2022-04-02 RX ORDER — WARFARIN SODIUM 1 MG/1
1 TABLET ORAL
Status: COMPLETED | OUTPATIENT
Start: 2022-04-02 | End: 2022-04-02

## 2022-04-02 RX ADMIN — PANTOPRAZOLE SODIUM 40 MG: 40 TABLET, DELAYED RELEASE ORAL at 07:32

## 2022-04-02 RX ADMIN — WARFARIN 1 MG: 1 TABLET ORAL at 18:11

## 2022-04-02 RX ADMIN — RISPERIDONE 0.25 MG: 0.25 TABLET ORAL at 09:23

## 2022-04-02 RX ADMIN — SOTALOL HYDROCHLORIDE 40 MG: 80 TABLET ORAL at 09:23

## 2022-04-02 RX ADMIN — Medication 10 ML: at 22:33

## 2022-04-02 RX ADMIN — SOTALOL HYDROCHLORIDE 40 MG: 80 TABLET ORAL at 22:11

## 2022-04-02 RX ADMIN — WARFARIN 2.5 MG: 2.5 TABLET ORAL at 18:12

## 2022-04-02 RX ADMIN — RISPERIDONE 0.25 MG: 0.25 TABLET ORAL at 22:11

## 2022-04-02 RX ADMIN — NYSTATIN: 100000 POWDER TOPICAL at 09:31

## 2022-04-02 RX ADMIN — NYSTATIN: 100000 POWDER TOPICAL at 22:16

## 2022-04-02 RX ADMIN — ATORVASTATIN CALCIUM 40 MG: 20 TABLET, FILM COATED ORAL at 09:23

## 2022-04-02 RX ADMIN — FUROSEMIDE 40 MG: 40 INJECTION, SOLUTION INTRAMUSCULAR; INTRAVENOUS at 07:38

## 2022-04-02 NOTE — PLAN OF CARE
Goal Outcome Evaluation:  Plan of Care Reviewed With: patient, spouse        Progress: improving   Pt alert to self, slept all, hard to wake up, but VSS. Pt gets to bed side commode but little weaker.  Problem: Adult Inpatient Plan of Care  Goal: Absence of Hospital-Acquired Illness or Injury  Intervention: Identify and Manage Fall Risk  Recent Flowsheet Documentation  Taken 4/2/2022 0608 by Nupur Carrillo RN  Safety Promotion/Fall Prevention:   activity supervised   safety round/check completed  Taken 4/2/2022 0404 by Nupur Carrillo RN  Safety Promotion/Fall Prevention:   activity supervised   safety round/check completed  Taken 4/2/2022 0212 by Nupur Carrillo RN  Safety Promotion/Fall Prevention:   activity supervised   safety round/check completed  Taken 4/2/2022 0000 by Nupur Carrillo, RN  Safety Promotion/Fall Prevention:   activity supervised   safety round/check completed  Taken 4/1/2022 2200 by Nupur Carrillo, RN  Safety Promotion/Fall Prevention:   activity supervised   safety round/check completed  Taken 4/1/2022 2003 by Nupur Carrillo, RN  Safety Promotion/Fall Prevention:   activity supervised   assistive device/personal items within reach   clutter free environment maintained   fall prevention program maintained   nonskid shoes/slippers when out of bed   room organization consistent   safety round/check completed

## 2022-04-02 NOTE — PROGRESS NOTES
"DAILY PROGRESS NOTE  [facility]    Patient Identification:  Name: Tiara Hernandes  Age: 81 y.o.  Sex: female  :  1940  MRN: [id number]         Primary Care Physician: Luis Williamson MD    Subjective:  Interval History:She is very weak . Confused.    Objective:    Scheduled Meds:atorvastatin, 40 mg, Oral, Daily  nystatin, , Topical, BID  pantoprazole, 40 mg, Oral, QAM  risperiDONE, 0.25 mg, Oral, BID  sodium chloride, 10 mL, Intravenous, Q12H  sotalol, 40 mg, Oral, BID  warfarin, 2.5 mg, Oral, Once per day on Sun Tue Thu Sat  warfarin, 3 mg, Oral, Once per day on       Continuous Infusions:Pharmacy to dose warfarin,         Vital signs in last 24 hours:  Temp:  [97.4 °F (36.3 °C)-98.3 °F (36.8 °C)] 98.3 °F (36.8 °C)  Heart Rate:  [59-75] 59  Resp:  [18] 18  BP: (113-137)/(45-77) 131/73    Intake/Output:    Intake/Output Summary (Last 24 hours) at 2022 1235  Last data filed at 2022 0824  Gross per 24 hour   Intake 160 ml   Output --   Net 160 ml       Exam:  /73 (BP Location: Right arm, Patient Position: Lying)   Pulse 59   Temp 98.3 °F (36.8 °C) (Oral)   Resp 18   Ht 157.5 cm (62\")   Wt 96.6 kg (213 lb)   SpO2 100%   BMI 38.96 kg/m²     General Appearance:    confused, no distress   Head:    Normocephalic, without obvious abnormality, atraumatic   Eyes:       Throat:   Lips, tongue, gums normal   Neck:   Supple, symmetrical, trachea midline, no JVD   Lungs:     Clear to auscultation bilaterally, respirations unlabored   Chest Wall:    No tenderness or deformity    Heart:    Regular rate and rhythm, S1 and S2 normal, no murmur,no  Rub or gallop   Abdomen:     Soft, nontender, bowel sounds active, no masses, no organomegaly    Extremities:   Extremities normal, atraumatic, no cyanosis or edema   Pulses:      Skin:   Skin is warm and dry,  no rashes or palpable lesions   Neurologic:   no focal deficits noted, confused      Lab Results (last 72 hours)     " Procedure Component Value Units Date/Time    Blood Gas, Arterial - [[id number]]  (Abnormal) Collected: 04/01/22 1043    Specimen: Arterial Blood Updated: 04/01/22 1047     Site Arterial: right radial     Kleber's Test Positive     pH, Arterial 7.293 pH units      Comment: 1040 nmccavila Meter: [id number] : sancho Meraomas NathanCritical:Notify ERIC lieca (01-Apr-22 10:46:11)Read back ok        pCO2, Arterial 60.3 mm Hg      Comment: Critical:Notify ERIC licea (01-Apr-22 10:46:11)Read back ok        pO2, Arterial 194.6 mm Hg      HCO3, Arterial 29.2 mmol/L      Base Excess, Arterial 1.4 mmol/L      O2 Saturation Calculated 99.5 %      Barometric Pressure for Blood Gas 749.8 mmHg      Modality Cannula     Flow Rate 6 lpm      Rate 12 Breaths/minute     POC Glucose Once [[id number]]  (Normal) Collected: 04/01/22 1042    Specimen: Blood Updated: 04/01/22 1044     Glucose 128 mg/dL      Comment: Meter: [id number] : [id number] Phil Francis RN       POC Glucose Once [[id number]]  (Normal) Collected: 04/01/22 0748    Specimen: Blood Updated: 04/01/22 0749     Glucose 109 mg/dL      Comment: Meter: [id number] : [id number] Lito Bills CNA       Basic Metabolic Panel [[id number]]  (Abnormal) Collected: 04/01/22 0628    Specimen: Blood Updated: 04/01/22 0734     Glucose 105 mg/dL      BUN 17 mg/dL      Creatinine 1.15 mg/dL      Sodium 142 mmol/L      Potassium 4.6 mmol/L      Chloride 109 mmol/L      CO2 26.2 mmol/L      Calcium 9.0 mg/dL      BUN/Creatinine Ratio 14.8     Anion Gap 6.8 mmol/L      eGFR 48.6 mL/min/1.73      Comment: National Kidney Foundation and American Society of Nephrology (ASN) Task Force recommended calculation based on the Chronic Kidney Disease Epidemiology Collaboration (CKD-EPI) equation refit without adjustment for race.       Narrative:      GFR Normal >60  Chronic Kidney Disease <60  Kidney Failure <15      Protime-INR [[id number]]  (Abnormal) Collected: 04/01/22  0628    Specimen: Blood Updated: 04/01/22 0725     Protime 22.6 Seconds      INR 1.99    POC Glucose Once [[id number]]  (Normal) Collected: 03/31/22 2112    Specimen: Blood Updated: 03/31/22 2113     Glucose 119 mg/dL      Comment: Meter: [id number] : [id number] Jose JOHN       POC Glucose Once [[id number]]  (Normal) Collected: 03/31/22 1734    Specimen: Blood Updated: 03/31/22 1737     Glucose 116 mg/dL      Comment: Meter: [id number] : [id number] Ravindra Kaylynn SHAMAR       Urine Culture - Urine, Urine, Catheter In/Out [[id number]]  (Normal) Collected: 03/30/22 1024    Specimen: Urine, Catheter In/Out Updated: 03/31/22 1432     Urine Culture No growth    POC Glucose Once [[id number]]  (Normal) Collected: 03/31/22 1301    Specimen: Blood Updated: 03/31/22 1302     Glucose 121 mg/dL      Comment: Meter: [id number] : [id number] Ravindra Kaylynn SHAMAR       POC Glucose Once [[id number]]  (Normal) Collected: 03/31/22 0841    Specimen: Blood Updated: 03/31/22 0852     Glucose 115 mg/dL      Comment: Meter: [id number] : [id number] Ravindra Kaylynn SHAMAR       Basic Metabolic Panel [[id number]]  (Abnormal) Collected: 03/31/22 0734    Specimen: Blood Updated: 03/31/22 0816     Glucose 112 mg/dL      BUN 17 mg/dL      Creatinine 1.21 mg/dL      Sodium 141 mmol/L      Potassium 4.7 mmol/L      Chloride 108 mmol/L      CO2 25.0 mmol/L      Calcium 8.8 mg/dL      BUN/Creatinine Ratio 14.0     Anion Gap 8.0 mmol/L      eGFR 45.7 mL/min/1.73      Comment: National Kidney Foundation and American Society of Nephrology (ASN) Task Force recommended calculation based on the Chronic Kidney Disease Epidemiology Collaboration (CKD-EPI) equation refit without adjustment for race.       Narrative:      GFR Normal >60  Chronic Kidney Disease <60  Kidney Failure <15      Protime-INR [[id number]]  (Abnormal) Collected: 03/31/22 0734    Specimen: Blood from Hand, Right Updated: 03/31/22 0759     Protime 21.2 Seconds      INR 1.83    CBC  & Differential [[id number]]  (Normal) Collected: 03/31/22 0734    Specimen: Blood Updated: 03/31/22 0751    Narrative:      The following orders were created for panel order CBC & Differential.  Procedure                               Abnormality         Status                     ---------                               -----------         ------                     CBC Auto Differential[[id number]]        Normal              Final result                 Please view results for these tests on the individual orders.    CBC Auto Differential [[id number]]  (Normal) Collected: 03/31/22 0734    Specimen: Blood Updated: 03/31/22 0751     WBC 8.63 10*3/mm3      RBC 4.11 10*6/mm3      Hemoglobin 12.5 g/dL      Hematocrit 38.2 %      MCV 92.9 fL      MCH 30.4 pg      MCHC 32.7 g/dL      RDW 14.3 %      RDW-SD 48.1 fl      MPV 9.8 fL      Platelets 210 10*3/mm3      Neutrophil % 67.6 %      Lymphocyte % 21.3 %      Monocyte % 8.3 %      Eosinophil % 2.3 %      Basophil % 0.2 %      Immature Grans % 0.3 %      Neutrophils, Absolute 5.82 10*3/mm3      Lymphocytes, Absolute 1.84 10*3/mm3      Monocytes, Absolute 0.72 10*3/mm3      Eosinophils, Absolute 0.20 10*3/mm3      Basophils, Absolute 0.02 10*3/mm3      Immature Grans, Absolute 0.03 10*3/mm3      nRBC 0.0 /100 WBC     POC Glucose Once [[id number]]  (Normal) Collected: 03/30/22 2052    Specimen: Blood Updated: 03/30/22 2053     Glucose 96 mg/dL      Comment: Meter: [id number] : [id number] Jose JOHN       POC Glucose Once [[id number]]  (Normal) Collected: 03/30/22 1643    Specimen: Blood Updated: 03/30/22 1645     Glucose 110 mg/dL      Comment: Meter: [id number] : [id number] Ravindra Electronic Brailler SHAMAR       POC Glucose Once [[id number]]  (Normal) Collected: 03/30/22 1306    Specimen: Blood Updated: 03/30/22 1325     Glucose 102 mg/dL      Comment: Meter: [id number] : [id number] Ravindra Electronic Brailler SHAMAR       Basic Metabolic Panel [[id number]]  (Abnormal) Collected: 03/30/22  1224    Specimen: Blood Updated: 03/30/22 1300     Glucose 111 mg/dL      BUN 15 mg/dL      Creatinine 1.04 mg/dL      Sodium 142 mmol/L      Potassium 4.6 mmol/L      Chloride 110 mmol/L      CO2 26.0 mmol/L      Calcium 8.8 mg/dL      BUN/Creatinine Ratio 14.4     Anion Gap 6.0 mmol/L      eGFR 54.8 mL/min/1.73      Comment: National Kidney Foundation and American Society of Nephrology (ASN) Task Force recommended calculation based on the Chronic Kidney Disease Epidemiology Collaboration (CKD-EPI) equation refit without adjustment for race.       Narrative:      GFR Normal >60  Chronic Kidney Disease <60  Kidney Failure <15      Protime-INR [[id number]]  (Abnormal) Collected: 03/30/22 1224    Specimen: Blood Updated: 03/30/22 1243     Protime 22.5 Seconds      INR 1.98    Urinalysis With Culture If Indicated - Urine, Catheter In/Out [[id number]]  (Abnormal) Collected: 03/30/22 1024    Specimen: Urine, Catheter In/Out Updated: 03/30/22 1051     Color, UA Yellow     Appearance, UA Clear     pH, UA 6.5     Specific Gravity, UA 1.014     Glucose, UA Negative     Ketones, UA Negative     Bilirubin, UA Negative     Blood, UA Negative     Protein, UA Negative     Leuk Esterase, UA Trace     Nitrite, UA Negative     Urobilinogen, UA 0.2 E.U./dL    Urinalysis, Microscopic Only - Urine, Catheter In/Out [[id number]]  (Abnormal) Collected: 03/30/22 1024    Specimen: Urine, Catheter In/Out Updated: 03/30/22 1051     RBC, UA 0-2 /HPF      WBC, UA 6-12 /HPF      Bacteria, UA 1+ /HPF      Squamous Epithelial Cells, UA 7-12 /HPF      Hyaline Casts, UA 0-2 /LPF      Methodology Automated Microscopy    Comprehensive Metabolic Panel [[id number]]  (Abnormal) Collected: 03/30/22 0755    Specimen: Blood Updated: 03/30/22 0845     Glucose 102 mg/dL      BUN 16 mg/dL      Creatinine 0.90 mg/dL      Sodium 151 mmol/L      Potassium 4.9 mmol/L      Chloride 107 mmol/L      CO2 26.1 mmol/L      Calcium 8.8 mg/dL      Total Protein 6.4 g/dL       Albumin 3.30 g/dL      ALT (SGPT) 22 U/L      AST (SGOT) 18 U/L      Alkaline Phosphatase 61 U/L      Total Bilirubin 0.5 mg/dL      Globulin 3.1 gm/dL      A/G Ratio 1.1 g/dL      BUN/Creatinine Ratio 17.8     Anion Gap 17.9 mmol/L      eGFR 65.2 mL/min/1.73      Comment: National Kidney Foundation and American Society of Nephrology (ASN) Task Force recommended calculation based on the Chronic Kidney Disease Epidemiology Collaboration (CKD-EPI) equation refit without adjustment for race.       Narrative:      GFR Normal >60  Chronic Kidney Disease <60  Kidney Failure <15      POC Glucose Once [[id number]]  (Normal) Collected: 03/30/22 0814    Specimen: Blood Updated: 03/30/22 0833     Glucose 93 mg/dL      Comment: Meter: [id number] : [id number] Ravindra IBANEZ       CBC Auto Differential [[id number]]  (Normal) Collected: 03/30/22 0755    Specimen: Blood Updated: 03/30/22 0829     WBC 5.92 10*3/mm3      RBC 4.06 10*6/mm3      Hemoglobin 12.3 g/dL      Hematocrit 37.4 %      MCV 92.1 fL      MCH 30.3 pg      MCHC 32.9 g/dL      RDW 14.5 %      RDW-SD 48.6 fl      MPV 10.1 fL      Platelets 213 10*3/mm3      Neutrophil % 62.8 %      Lymphocyte % 24.5 %      Monocyte % 8.4 %      Eosinophil % 3.7 %      Basophil % 0.3 %      Immature Grans % 0.3 %      Neutrophils, Absolute 3.71 10*3/mm3      Lymphocytes, Absolute 1.45 10*3/mm3      Monocytes, Absolute 0.50 10*3/mm3      Eosinophils, Absolute 0.22 10*3/mm3      Basophils, Absolute 0.02 10*3/mm3      Immature Grans, Absolute 0.02 10*3/mm3      nRBC 0.0 /100 WBC     POC Glucose Once [[id number]]  (Normal) Collected: 03/30/22 0643    Specimen: Blood Updated: 03/30/22 0644     Glucose 113 mg/dL      Comment: Meter: [id number] : [id number] Abena JOHN       [place] Draw [[id number]] Collected: 03/29/22 1127    Specimen: Blood Updated: 03/29/22 1333    Narrative:      The following orders were created for panel order [place] Draw.  Procedure                                Abnormality         Status                     ---------                               -----------         ------                     Green Top (Gel)[[id number]]                                  Final result               Lavender Top[[id number]]                                     Final result               Gold Top - SST[[id number]]                                   Final result               Light Blue Top[[id number]]                                   Final result                 Please view results for these tests on the individual orders.    Gold Top - SST [[id number]] Collected: 03/29/22 1224    Specimen: Blood Updated: 03/29/22 1333     Extra Tube Hold for add-ons.     Comment: Auto resulted.       BNP [[id number]]  (Normal) Collected: 03/29/22 1225    Specimen: Blood Updated: 03/29/22 1326     proBNP 735.0 pg/mL     Narrative:      Among patients with dyspnea, NT-proBNP is highly sensitive for the detection of acute congestive heart failure. In addition NT-proBNP of <300 pg/ml effectively rules out acute congestive heart failure with 99% negative predictive value.    Results may be falsely decreased if patient taking Biotin.      Comprehensive Metabolic Panel [[id number]]  (Abnormal) Collected: 03/29/22 1225    Specimen: Blood Updated: 03/29/22 1259     Glucose 99 mg/dL      BUN 19 mg/dL      Creatinine 1.25 mg/dL      Sodium 139 mmol/L      Potassium 5.6 mmol/L      Chloride 105 mmol/L      CO2 27.0 mmol/L      Calcium 9.1 mg/dL      Total Protein 6.8 g/dL      Albumin 3.90 g/dL      ALT (SGPT) 12 U/L      AST (SGOT) 18 U/L      Alkaline Phosphatase 65 U/L      Total Bilirubin 0.4 mg/dL      Globulin 2.9 gm/dL      A/G Ratio 1.3 g/dL      BUN/Creatinine Ratio 15.2     Anion Gap 7.0 mmol/L      eGFR 43.9 mL/min/1.73      Comment: National Kidney Foundation and American Society of Nephrology (ASN) Task Force recommended calculation based on the Chronic Kidney Disease Epidemiology Collaboration (CKD-EPI)  equation refit without adjustment for race.       Narrative:      GFR Normal >60  Chronic Kidney Disease <60  Kidney Failure <15      COVID PRE-OP / PRE-PROCEDURE SCREENING ORDER (NO ISOLATION) - Swab, Nasopharynx [[id number]]  (Normal) Collected: 03/29/22 1207    Specimen: Swab from Nasopharynx Updated: 03/29/22 1235    Narrative:      The following orders were created for panel order COVID PRE-OP / PRE-PROCEDURE SCREENING ORDER (NO ISOLATION) - Swab, Nasopharynx.  Procedure                               Abnormality         Status                     ---------                               -----------         ------                     COVID-19,[facility] IN-HOUSE...[[id number]]  Normal              Final result                 Please view results for these tests on the individual orders.    COVID-19,[facility] IN-HOUSE CEPHEID/LILY NP SWAB IN TRANSPORT MEDIA 8-12 HR TAT - Swab, Nasopharynx [[id number]]  (Normal) Collected: 03/29/22 1207    Specimen: Swab from Nasopharynx Updated: 03/29/22 1235     COVID19 Not Detected    Narrative:      Fact sheet for providers: https://www.fda.gov/media/634415/download    Fact sheet for patients: https://www.fda.gov/media/831258/download    Test performed by PCR.    Lavender Top [[id number]] Collected: 03/29/22 1127    Specimen: Blood Updated: 03/29/22 1233     Extra Tube hold for add-on     Comment: Auto resulted       Green Top (Gel) [[id number]] Collected: 03/29/22 1127    Specimen: Blood Updated: 03/29/22 1233     Extra Tube Hold for add-ons.     Comment: Auto resulted.       Light Blue Top [[id number]] Collected: 03/29/22 1127    Specimen: Blood Updated: 03/29/22 1233     Extra Tube hold for add-on     Comment: Auto resulted       Troponin [[id number]]  (Normal) Collected: 03/29/22 1127    Specimen: Blood Updated: 03/29/22 1152     Troponin T <0.010 ng/mL     Narrative:      Troponin T Reference Range:  <= 0.03 ng/mL-   Negative for AMI  >0.03 ng/mL-     Abnormal for myocardial necrosis.   Clinicians would have to utilize clinical acumen, EKG, Troponin and serial changes to determine if it is an Acute Myocardial Infarction or myocardial injury due to an underlying chronic condition.       Results may be falsely decreased if patient taking Biotin.      aPTT [[id number]]  (Normal) Collected: 03/29/22 1127    Specimen: Blood Updated: 03/29/22 1143     PTT 32.3 seconds     Protime-INR [[id number]]  (Abnormal) Collected: 03/29/22 1127    Specimen: Blood Updated: 03/29/22 1143     Protime 25.9 Seconds      INR 2.37    CBC & Differential [[id number]]  (Normal) Collected: 03/29/22 1127    Specimen: Blood Updated: 03/29/22 1133    Narrative:      The following orders were created for panel order CBC & Differential.  Procedure                               Abnormality         Status                     ---------                               -----------         ------                     CBC Auto Differential[[id number]]        Normal              Final result                 Please view results for these tests on the individual orders.    CBC Auto Differential [[id number]]  (Normal) Collected: 03/29/22 1127    Specimen: Blood Updated: 03/29/22 1133     WBC 7.04 10*3/mm3      RBC 4.25 10*6/mm3      Hemoglobin 13.0 g/dL      Hematocrit 40.1 %      MCV 94.4 fL      MCH 30.6 pg      MCHC 32.4 g/dL      RDW 14.7 %      RDW-SD 50.8 fl      MPV 11.1 fL      Platelets 305 10*3/mm3      Neutrophil % 59.6 %      Lymphocyte % 28.0 %      Monocyte % 8.1 %      Eosinophil % 3.7 %      Basophil % 0.3 %      Immature Grans % 0.3 %      Neutrophils, Absolute 4.20 10*3/mm3      Lymphocytes, Absolute 1.97 10*3/mm3      Monocytes, Absolute 0.57 10*3/mm3      Eosinophils, Absolute 0.26 10*3/mm3      Basophils, Absolute 0.02 10*3/mm3      Immature Grans, Absolute 0.02 10*3/mm3      nRBC 0.0 /100 WBC         Data Review:  Results from last 7 days   Lab Units 04/02/22  0752 04/01/22  0628 03/31/22  0734   SODIUM mmol/L 142 142 141    POTASSIUM mmol/L 4.7 4.6 4.7   CHLORIDE mmol/L 108* 109* 108*   CO2 mmol/L 24.0 26.2 25.0   BUN mg/dL 14 17 17   CREATININE mg/dL 1.11* 1.15* 1.21*   GLUCOSE mg/dL 106* 105* 112*   CALCIUM mg/dL 8.9 9.0 8.8     Results from last 7 days   Lab Units 04/02/22  0752 03/31/22  0734 03/30/22  0755   WBC 10*3/mm3 8.14 8.63 5.92   HEMOGLOBIN g/dL 12.8 12.5 12.3   HEMATOCRIT % 40.8 38.2 37.4   PLATELETS 10*3/mm3 201 210 213             Lab Results   Lab Value Date/Time    TROPONINT <0.010 03/29/2022 1127    TROPONINT <0.010 08/22/2019 1957         Results from last 7 days   Lab Units 03/30/22  0755 03/29/22  1225   ALK PHOS U/L 61 65   BILIRUBIN mg/dL 0.5 0.4   ALT (SGPT) U/L 22 12   AST (SGOT) U/L 18 18             Glucose   Date/Time Value Ref Range Status   04/02/2022 1133 109 70 - 130 mg/dL Final     Comment:     Meter: [id number] : [id number] [facility]   04/02/2022 0737 90 70 - 130 mg/dL Final     Comment:     Meter: [id number] : [id number] [facility]   04/01/2022 2158 121 70 - 130 mg/dL Final     Comment:     Meter: [id number] : [id number] Abena Feng NA   04/01/2022 1710 114 70 - 130 mg/dL Final     Comment:     Meter: [id number] : [id number] [facility]   04/01/2022 1209 96 70 - 130 mg/dL Final     Comment:     Meter: [id number] : [id number] [facility]   04/01/2022 1042 128 70 - 130 mg/dL Final     Comment:     Meter: [id number] : [id number] Phil Francis RN   04/01/2022 0748 109 70 - 130 mg/dL Final     Comment:     Meter: [id number] : [id number] Lito Bills  CNA   03/31/2022 2112 119 70 - 130 mg/dL Final     Comment:     Meter: [id number] : [id number] Jose JOHN     Results from last 7 days   Lab Units 04/02/22  0752 04/01/22  0628 03/31/22  0734   INR  1.94* 1.99* 1.83*       Past Medical History:   Diagnosis Date   • Anxiety    • Atherosclerotic heart disease of native coronary artery without angina pectoris    •  Cardiac pacemaker    • Chronic kidney disease, stage 3 (HCC)    • Dementia without behavioral disturbance (HCC)    • Hyperlipidemia    • Major depressive disorder    • Malignant neoplasm of left breast (HCC)    • Obesity    • Portal vein thrombosis    • Type 2 diabetes mellitus (HCC)        Assessment:  Active Hospital Problems    Diagnosis  POA   • **Altered mental status [R41.82]  Yes   • KAIA (obstructive sleep apnea) [G47.33]  Unknown   • Acute on chronic respiratory failure with hypercapnia (HCC) [J96.22]  Unknown   • Type 2 diabetes mellitus (HCC) [E11.9]  Yes   • Atherosclerotic heart disease of native coronary artery without angina pectoris [I25.10]  Yes   • Chronic kidney disease, stage 3 (HCC) [N18.30]  Yes   • Sick sinus syndrome (HCC) [I49.5]  Yes   • Hypernatremia [E87.0]  Clinically Undetermined   • HTN (hypertension) [I10]  Yes   • DNR (do not resuscitate) [Z66]  Yes   • Dementia (HCC) [F03.90]  Yes   • History of breast cancer [Z85.3]  Not Applicable   • Chronic anticoagulation [Z79.01]  Not Applicable   • Hyperkalemia [E87.5]  Yes      Resolved Hospital Problems   No resolved problems to display.       Plan:   pulmonary consult noted. ON CPAP.   Continue current RX. Follow lab.  Alexandre Ramsey MD  4/2/2022  12:35 EDT

## 2022-04-02 NOTE — PROGRESS NOTES
[facility] Clinical Pharmacy Services: Warfarin Dosing/Monitoring Consult    Tiara Hernandes is a 81 y.o. female, estimated creatinine clearance is 43 mL/min (A) (by C-G formula based on SCr of 1.15 mg/dL (H)). weighing 96.6 kg (213 lb).    Results from last 7 days   Lab Units 04/02/22  0752 04/01/22  0628 03/31/22  0734 03/30/22  1224 03/30/22  0755 03/29/22  1127   INR  1.94* 1.99* 1.83* 1.98*  --  2.37*   APTT seconds  --   --   --   --   --  32.3   HEMOGLOBIN g/dL 12.8  --  12.5  --  12.3 13.0   HEMATOCRIT % 40.8  --  38.2  --  37.4 40.1   PLATELETS 10*3/mm3 201  --  210  --  213 305     Prior to admission anticoagulation: 3mg MWF with 2.5mg TTSS    Hospital Anticoagulation:  Consulting provider: Sarath  Start date: 3/30/22  Indication: Atrial Fibrillation - requiring full anticoagulation  Target INR: 2 - 3  Expected duration: indefinite  Bridge Therapy: no    Potential food or drug interactions: none    Education complete?/Date: No; plan for follow up TBD    Assessment/Plan:  Dose: INR today at 1.94 (slightly sub therapeutic ) will continue her home regimen and give her regularly scheduled dose of 2.5mg today with an additional 1 time dose of 1mg to equal total of 3.5mg  Monitor for any signs or symptoms of bleeding  Follow up daily INRs and dose adjustments    Pharmacy will continue to follow until discharge or discontinuation of warfarin.     Radha Morgan, [facility]    Clinical Pharmacist

## 2022-04-02 NOTE — PLAN OF CARE
Goal Outcome Evaluation:  Plan of Care Reviewed With: patient, spouse        Progress: improving  Outcome Evaluation: Pt demonstrating good progress with PT today. Pt performed bed mobility with CGA, transfered with RW, performed toilet transfer with minand ambulated 2x10ft with RW CGA/Vandana demonstrating impaired strength, balance, gait, and activity tolerance. Pt could continue to benefit from skilled PT to address deficits    Patient was not wearing a face mask during this therapy encounter. Therapist used appropriate personal protective equipment including eye protection, mask, and gloves.  Mask used was standard procedure mask. Appropriate PPE was worn during the entire therapy session. Hand hygiene was completed before and after therapy session. Patient is not in enhanced droplet precautions.

## 2022-04-02 NOTE — THERAPY TREATMENT NOTE
Patient Name: Tiara Hernandes  : 1940    MRN: [id number]                              Today's Date: 2022       Admit Date: 3/29/2022    Visit Dx:     ICD-10-CM ICD-9-CM   1. Cerebrovascular accident (CVA), unspecified mechanism (HCC)  I63.9 434.91   2. Altered mental status, unspecified altered mental status type  R41.82 780.97   3. Hyperkalemia  E87.5 276.7   4. Dementia with behavioral disturbance, unspecified dementia type (HCC)  F03.91 294.21   5. Aspiration into airway, initial encounter  T17.908A 934.9   6. Warfarin-induced coagulopathy (HCC)  D68.32 286.7    T45.515A E934.2     Patient Active Problem List   Diagnosis   • HTN (hypertension)   • DNR (do not resuscitate)   • Dementia (HCC)   • History of breast cancer   • Chronic anticoagulation   • Hyperkalemia   • Altered mental status   • Type 2 diabetes mellitus (HCC)   • Atherosclerotic heart disease of native coronary artery without angina pectoris   • Chronic kidney disease, stage 3 (HCC)   • Sick sinus syndrome (HCC)   • Hypernatremia   • KAIA (obstructive sleep apnea)   • Acute on chronic respiratory failure with hypercapnia (HCC)     Past Medical History:   Diagnosis Date   • Anxiety    • Atherosclerotic heart disease of native coronary artery without angina pectoris    • Cardiac pacemaker    • Chronic kidney disease, stage 3 (HCC)    • Dementia without behavioral disturbance (HCC)    • Hyperlipidemia    • Major depressive disorder    • Malignant neoplasm of left breast (HCC)    • Obesity    • Portal vein thrombosis    • Type 2 diabetes mellitus (HCC)      History reviewed. No pertinent surgical history.   General Information     Row Name 22 1552          Physical Therapy Time and Intention    Document Type therapy note (daily note)  -SR     Mode of Treatment physical therapy;individual therapy  -SR     Row Name 22 1552          General Information    Patient Profile Reviewed yes  -SR     Existing Precautions/Restrictions fall   -SR     Barriers to Rehab cognitive status  -SR     Row Name 04/02/22 1552          Cognition    Orientation Status (Cognition) oriented to;person  -SR     Row Name 04/02/22 1552          Safety Issues, Functional Mobility    Safety Issues Affecting Function (Mobility) awareness of need for assistance;insight into deficits/self-awareness  -SR     Impairments Affecting Function (Mobility) balance;cognition;endurance/activity tolerance;strength;postural/trunk control  -SR     Cognitive Impairments, Mobility Safety/Performance awareness, need for assistance;insight into deficits/self-awareness  -SR     Comment, Safety Issues/Impairments (Mobility) gait belt and non slip socks for safety  -SR           User Key  (r) = Recorded By, (t) = Taken By, (c) = Cosigned By    Initials Name Provider Type    SR Bibi Diehl Physical Therapist               Mobility     Row Name 04/02/22 1552          Bed Mobility    Bed Mobility supine-sit  -SR     Supine-Sit [place] (Bed Mobility) contact guard  -SR     Assistive Device (Bed Mobility) bed rails  -SR     Row Name 04/02/22 1552          Transfers    Comment, (Transfers) sit <> stand with RW CGA; toilet transfer with cues for use of grab bar Vandana  -SR     Row Name 04/02/22 1552          Sit-Stand Transfer    Sit-Stand [place] (Transfers) contact guard;minimum assist (75% patient effort);1 person assist;verbal cues  -SR     Assistive Device (Sit-Stand Transfers) walker, front-wheeled  -SR     Row Name 04/02/22 1552          Gait/Stairs (Locomotion)    [place] Level (Gait) contact guard;minimum assist (75% patient effort);1 person assist;verbal cues  -SR     Assistive Device (Gait) walker, front-wheeled  -SR     Distance in Feet (Gait) 2x10ft  -SR     Deviations/Abnormal Patterns (Gait) festinating/shuffling  -SR     Bilateral Gait Deviations forward flexed posture  -SR     Comment, (Gait/Stairs) 1 LOB with Vandana to recover, CGA for ambulation with RW, distance limited  by request to use bathroom, cues for positioning AD within JAIME  -SR           User Key  (r) = Recorded By, (t) = Taken By, (c) = Cosigned By    Initials Name Provider Type    SR Bibi Diehl Physical Therapist               Obj/Interventions     Row Name 04/02/22 1554          Balance    Balance Assessment sitting static balance;sitting dynamic balance;standing static balance;standing dynamic balance  -SR     Static Sitting Balance independent  -SR     Dynamic Sitting Balance independent  -SR     Position, Sitting Balance sitting edge of bed  -SR     Static Standing Balance contact guard  -SR     Dynamic Standing Balance minimal assist  -SR     Position/Device Used, Standing Balance walker, front-wheeled  -SR           User Key  (r) = Recorded By, (t) = Taken By, (c) = Cosigned By    Initials Name Provider Type    SR Bibi Diehl Physical Therapist               Goals/Plan    No documentation.                Clinical Impression     Row Name 04/02/22 1554          Pain    Pretreatment Pain Rating 0/10 - no pain  -SR     Row Name 04/02/22 4546          Plan of Care Review    Plan of Care Reviewed With patient;spouse  -SR     Progress improving  -SR     Outcome Evaluation Pt demonstrating good progress with PT today. Pt performed bed mobility with CGA, transfered with RW, performed toilet transfer with minand ambulated 2x10ft with RW CGA/Vandana demonstrating impaired strength, balance, gait, and activity tolerance. Pt could continue to benefit from skilled PT to address deficits  -SR     Row Name 04/02/22 3288          Therapy Assessment/Plan (PT)    Rehab Potential (PT) good, to achieve stated therapy goals  -SR     Criteria for Skilled Interventions Met (PT) yes  -SR     Row Name 04/02/22 7797          Positioning and Restraints    Pre-Treatment Position in bed  -SR     Post Treatment Position chair  -SR     In Chair reclined;call light within reach;encouraged to call for assist;exit alarm on;with  family/caregiver;notified nsg  -SR           User Key  (r) = Recorded By, (t) = Taken By, (c) = Cosigned By    Initials Name Provider Type    Bibi Castro Physical Therapist               Outcome Measures     Row Name 04/02/22 1556          How much help from another person do you currently need...    Turning from your back to your side while in flat bed without using bedrails? 3  -SR     Moving from lying on back to sitting on the side of a flat bed without bedrails? 3  -SR     Moving to and from a bed to a chair (including a wheelchair)? 3  -SR     Standing up from a chair using your arms (e.g., wheelchair, bedside chair)? 3  -SR     Climbing 3-5 steps with a railing? 2  -SR     To walk in hospital room? 3  -SR     AM-PAC 6 Clicks Score (PT) 17  -SR     Row Name 04/02/22 1556          Functional Assessment    Outcome Measure Options AM-PAC 6 Clicks Basic Mobility (PT)  -SR           User Key  (r) = Recorded By, (t) = Taken By, (c) = Cosigned By    Initials Name Provider Type    Bibi Castro Physical Therapist                             Physical Therapy Education                 Title: PT OT SLP Therapies (In Progress)     Topic: Physical Therapy (Done)     Point: Mobility training (Done)     Learning Progress Summary           Patient Acceptance, E, VU by SR at 4/2/2022 1556    Acceptance, E, VU,NR by DB at 3/31/2022 1011   Family Acceptance, E, VU by SR at 4/2/2022 1556                   Point: Home exercise program (Done)     Learning Progress Summary           Patient Acceptance, E, VU by SR at 4/2/2022 1556    Acceptance, E, VU,NR by DB at 3/31/2022 1011   Family Acceptance, E, VU by SR at 4/2/2022 1556                   Point: Body mechanics (Done)     Learning Progress Summary           Patient Acceptance, E, VU by SR at 4/2/2022 1556    Acceptance, E, VU,NR by DB at 3/31/2022 1011   Family Acceptance, E, VU by SR at 4/2/2022 1556                   Point: Precautions (Done)     Learning Progress  Summary           Patient Acceptance, E, VU by SR at 4/2/2022 1556    Acceptance, E, VU,NR by DB at 3/31/2022 1011   Family Acceptance, E, VU by SR at 4/2/2022 1556                               User Key     Initials Effective Dates Name Provider Type Discipline    DB 06/16/21 -  Kristyn Dasilva, PT Physical Therapist PT    SR 02/08/21 -  Bibi Diehl Physical Therapist PT              PT Recommendation and Plan     Plan of Care Reviewed With: patient, spouse  Progress: improving  Outcome Evaluation: Pt demonstrating good progress with PT today. Pt performed bed mobility with CGA, transfered with RW, performed toilet transfer with minand ambulated 2x10ft with RW CGA/Vandana demonstrating impaired strength, balance, gait, and activity tolerance. Pt could continue to benefit from skilled PT to address deficits     Time Calculation:    PT Charges     Row Name 04/02/22 1557             Time Calculation    Start Time 1355  -SR      Stop Time 1412  -SR      Time Calculation (min) 17 min  -SR      PT Received On 04/02/22  -SR      PT - Next Appointment 04/04/22  -              Time Calculation- PT    Total Timed Code Minutes- PT 17 minute(s)  -SR            User Key  (r) = Recorded By, (t) = Taken By, (c) = Cosigned By    Initials Name Provider Type    SR Bibi Diehl Physical Therapist              Therapy Charges for Today     Code Description Service Date Service Provider Modifiers Qty    [id number]  PT THERAPEUTIC ACT EA 15 MIN 4/2/2022 Bibi Diehl GP 1          PT G-Codes  Outcome Measure Options: AM-PAC 6 Clicks Basic Mobility (PT)  AM-PAC 6 Clicks Score (PT): 17  AM-PAC 6 Clicks Score (OT): 9    Bibi Diehl  4/2/2022

## 2022-04-02 NOTE — PROGRESS NOTES
[facility]     Mar/chart reviewed  Follow up Acute hypoxemic and hypercapnic respiratory failure  Some increased oxygen requirements overnight, still on pap  She still feels sleepy   says she has been like this since clonazepam was started    Vital Sign Min/Max for last 24 hours  Temp  Min: 97.4 °F (36.3 °C)  Max: 98.1 °F (36.7 °C)   BP  Min: 113/45  Max: 137/72   Pulse  Min: 59  Max: 76   Resp  Min: 16  Max: 21   SpO2  Min: 82 %  Max: 99 %   Flow (L/min)  Min: 2  Max: 15   No data recorded   Appears ill, sleepy   perrl, eomi, normal sclera,  mmm, no jvd, trachea midline, neck supple,  chest decreased ae bilaterally, no crackles, no wheezes,   rrr,   soft, nt, nd +bs,  no c/c/ 1+ edema  Skin warm, dry no rashes    Labs: 4/2: reviewed:  Nothing new  4/1:  probnp 3021  procal 0.07    A/P:  1. Acute hypercapnic respiratory failure -- suspect due to KAIA, OHV, atelectasis -- trial her home cpap and repeat gas as she appears not greatly affected at this point, she may need to use NIPPV instead we will see, avoid hyperoxia  2. KAIA - needs better compliance with pap  3. Atelectasis  4. Possibly OHV  5. Obesity  6. demenia   7. dNR  8. Acute on chronic diastolic chf -- will stop iv fluids, give dose of lasix    Repeat gas ordered, may need nippv  Need to stop the clonazepam, possibly stop respirdal as well.

## 2022-04-03 VITALS
RESPIRATION RATE: 18 BRPM | TEMPERATURE: 99.1 F | OXYGEN SATURATION: 94 % | BODY MASS INDEX: 39.2 KG/M2 | HEART RATE: 71 BPM | HEIGHT: 62 IN | DIASTOLIC BLOOD PRESSURE: 62 MMHG | WEIGHT: 213 LBS | SYSTOLIC BLOOD PRESSURE: 120 MMHG

## 2022-04-03 LAB
ANION GAP SERPL CALCULATED.3IONS-SCNC: 7.6 MMOL/L (ref 5–15)
BASOPHILS # BLD AUTO: 0.01 10*3/MM3 (ref 0–0.2)
BASOPHILS NFR BLD AUTO: 0.1 % (ref 0–1.5)
BUN SERPL-MCNC: 17 MG/DL (ref 8–23)
BUN/CREAT SERPL: 13.2 (ref 7–25)
CALCIUM SPEC-SCNC: 9.1 MG/DL (ref 8.6–10.5)
CHLORIDE SERPL-SCNC: 106 MMOL/L (ref 98–107)
CO2 SERPL-SCNC: 27.4 MMOL/L (ref 22–29)
CREAT SERPL-MCNC: 1.29 MG/DL (ref 0.57–1)
DEPRECATED RDW RBC AUTO: 47.9 FL (ref 37–54)
EGFRCR SERPLBLD CKD-EPI 2021: 42.3 ML/MIN/1.73
EOSINOPHIL # BLD AUTO: 0.22 10*3/MM3 (ref 0–0.4)
EOSINOPHIL NFR BLD AUTO: 3.1 % (ref 0.3–6.2)
ERYTHROCYTE [DISTWIDTH] IN BLOOD BY AUTOMATED COUNT: 14.5 % (ref 12.3–15.4)
GLUCOSE BLDC GLUCOMTR-MCNC: 123 MG/DL (ref 70–130)
GLUCOSE BLDC GLUCOMTR-MCNC: 93 MG/DL (ref 70–130)
GLUCOSE SERPL-MCNC: 97 MG/DL (ref 65–99)
HCT VFR BLD AUTO: 35.8 % (ref 34–46.6)
HGB BLD-MCNC: 12 G/DL (ref 12–15.9)
IMM GRANULOCYTES # BLD AUTO: 0.01 10*3/MM3 (ref 0–0.05)
IMM GRANULOCYTES NFR BLD AUTO: 0.1 % (ref 0–0.5)
INR PPP: 1.93 (ref 0.9–1.1)
LYMPHOCYTES # BLD AUTO: 1.66 10*3/MM3 (ref 0.7–3.1)
LYMPHOCYTES NFR BLD AUTO: 23.2 % (ref 19.6–45.3)
MCH RBC QN AUTO: 30.4 PG (ref 26.6–33)
MCHC RBC AUTO-ENTMCNC: 33.5 G/DL (ref 31.5–35.7)
MCV RBC AUTO: 90.6 FL (ref 79–97)
MONOCYTES # BLD AUTO: 0.68 10*3/MM3 (ref 0.1–0.9)
MONOCYTES NFR BLD AUTO: 9.5 % (ref 5–12)
NEUTROPHILS NFR BLD AUTO: 4.56 10*3/MM3 (ref 1.7–7)
NEUTROPHILS NFR BLD AUTO: 64 % (ref 42.7–76)
NRBC BLD AUTO-RTO: 0 /100 WBC (ref 0–0.2)
PLATELET # BLD AUTO: 197 10*3/MM3 (ref 140–450)
PMV BLD AUTO: 10.5 FL (ref 6–12)
POTASSIUM SERPL-SCNC: 4.1 MMOL/L (ref 3.5–5.2)
PROTHROMBIN TIME: 22.1 SECONDS (ref 11.7–14.2)
RBC # BLD AUTO: 3.95 10*6/MM3 (ref 3.77–5.28)
SODIUM SERPL-SCNC: 141 MMOL/L (ref 136–145)
WBC NRBC COR # BLD: 7.14 10*3/MM3 (ref 3.4–10.8)

## 2022-04-03 PROCEDURE — 80048 BASIC METABOLIC PNL TOTAL CA: CPT | Performed by: HOSPITALIST

## 2022-04-03 PROCEDURE — 82962 GLUCOSE BLOOD TEST: CPT

## 2022-04-03 PROCEDURE — 85610 PROTHROMBIN TIME: CPT | Performed by: STUDENT IN AN ORGANIZED HEALTH CARE EDUCATION/TRAINING PROGRAM

## 2022-04-03 PROCEDURE — 85025 COMPLETE CBC W/AUTO DIFF WBC: CPT | Performed by: HOSPITALIST

## 2022-04-03 RX ORDER — ACETAMINOPHEN 325 MG/1
650 TABLET ORAL EVERY 4 HOURS PRN
Start: 2022-04-03

## 2022-04-03 RX ORDER — WARFARIN SODIUM 2 MG/1
2 TABLET ORAL
Status: DISCONTINUED | OUTPATIENT
Start: 2022-04-03 | End: 2022-04-03 | Stop reason: HOSPADM

## 2022-04-03 RX ORDER — ATORVASTATIN CALCIUM 40 MG/1
40 TABLET, FILM COATED ORAL DAILY
Start: 2022-04-04 | End: 2022-04-22 | Stop reason: HOSPADM

## 2022-04-03 RX ORDER — HYDROXYZINE HYDROCHLORIDE 25 MG/1
25 TABLET, FILM COATED ORAL 3 TIMES DAILY PRN
Start: 2022-04-03 | End: 2022-04-19

## 2022-04-03 RX ORDER — HYDROXYZINE HYDROCHLORIDE 25 MG/1
25 TABLET, FILM COATED ORAL 3 TIMES DAILY PRN
Status: DISCONTINUED | OUTPATIENT
Start: 2022-04-03 | End: 2022-04-03 | Stop reason: HOSPADM

## 2022-04-03 RX ADMIN — NYSTATIN 1 APPLICATION: 100000 POWDER TOPICAL at 09:42

## 2022-04-03 RX ADMIN — ATORVASTATIN CALCIUM 40 MG: 20 TABLET, FILM COATED ORAL at 08:33

## 2022-04-03 RX ADMIN — PANTOPRAZOLE SODIUM 40 MG: 40 TABLET, DELAYED RELEASE ORAL at 08:32

## 2022-04-03 RX ADMIN — RISPERIDONE 0.25 MG: 0.25 TABLET ORAL at 08:33

## 2022-04-03 RX ADMIN — SOTALOL HYDROCHLORIDE 40 MG: 80 TABLET ORAL at 08:33

## 2022-04-03 RX ADMIN — HYDROXYZINE HYDROCHLORIDE 25 MG: 25 TABLET ORAL at 13:51

## 2022-04-03 NOTE — PROGRESS NOTES
[facility]      Mar/chart reviewed  Follow up Acute hypoxemic and hypercapnic respiratory failure  Off oxygen eager to go home   says she is back close to normal    Vital Sign Min/Max for last 24 hours  Temp  Min: 97.8 °F (36.6 °C)  Max: 98.4 °F (36.9 °C)   BP  Min: 111/68  Max: 140/60   Pulse  Min: 59  Max: 81   Resp  Min: 16  Max: 20   SpO2  Min: 96 %  Max: 99 %   Flow (L/min)  Min: 5  Max: 5   No data recorded     Appears ill, alert  perrl, eomi, normal sclera,  mmm, no jvd, trachea midline, neck supple,  chest decreased ae bilaterally, no crackles, no wheezes,   rrr,   soft, nt, nd +bs,  no c/c/ 1+ edema  Skin warm, dry no rashes    7.34/52/84    A/P:  1. Acute hypercapnic respiratory failure -- suspect due to KAIA, OHV, atelectasis and oversedation --  Better  2. KAIA - needs better compliance with pap  3. Atelectasis  4. Possibly OHV  5. Obesity  6. demenia   7. dNR  8. Acute on chronic diastolic chf -- continue home lasix on d/c    Recommend not resuming clonazepam on d/c    Will see prn. Ok with me to d/c.

## 2022-04-03 NOTE — DISCHARGE SUMMARY
PHYSICIAN DISCHARGE SUMMARY                                                                        [facility]    Patient Identification:  Name: Tiara Hernandes  Age: 81 y.o.  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   Acute on chronic diastolic heart failure  History of right-sided weakness unknown cause.    PMHX:   Past Medical History:   Diagnosis Date    Anxiety     Atherosclerotic heart disease of native coronary artery without angina pectoris     Cardiac pacemaker     Chronic kidney disease, stage 3 (HCC)     Dementia without behavioral disturbance (HCC)     Hyperlipidemia     Major depressive disorder     Malignant neoplasm of left breast (HCC)     Obesity     Portal vein thrombosis     Type 2 diabetes mellitus (HCC)      PSHX: History reviewed. No pertinent surgical history.    Hospital Course: Tiara Hernandes   is a 81-year-old female who has vascular dementia currently residing at the [facility]  facility has history of coronary artery disease and bypass grafting, irregular heartbeat on blood thinners as well as prior history of breast cancer.  She was noted to be doing fine last night and in fact at the assisted facility she attended breakfast though she did have some episodes of cough when eating food.  Subsequently patient was in her room and was found by the caring staff to be acting differently and was noted to have facial droop on the right side along with rightward lean.  EMS was called and patient was noted to be hypoxic.Team D was called and patient had stroke work-up including CT angiogram performed.  Neurology service evaluated the patient.  While the is being done patient has been given to the emergency room and recommended avoiding any heroic measures and wanted emphasis on her comfort.  Patient herself is feeling hungry wants something to drink.  She is feeling otherwise okay right now and able to converse which was difficult earlier today.  Patient still feels weak on the right side.  Patient has been wants her to be able to eat and allow to eat.  He does not want to continue medications that would require lots of lab work and wants her to be DNR and DNI.  In the emergency room she was noted to have elevated potassium of 5.6 which was treated with insulin D50 and albuterol inhaler.         The patient was admitted to the hospital and seen by pulmonary and neurology.  The patient had some more episodes of hypoxia and we did ask pulmonary to see.  Her family brought her CPAP from home and she was able to use it and was doing better after couple of days.  She is still very weak and the plan is to go to skilled nursing facility for rehab.  She will follow-up with her primary care after released from rehab.    Consults:     Consults       Date and Time Order Name Status Description    4/1/2022 11:03 AM Inpatient Pulmonology Consult Completed     3/29/2022  2:08 PM LHDAMIAN (on-call MD unless specified)  Details      3/29/2022 11:24 AM Inpatient Neurology Consult Stroke Completed     3/29/2022 11:24 AM Inpatient Neurology Consult Stroke Completed           Results from last 7 days   Lab Units 04/03/22  0644   WBC 10*3/mm3 7.14   HEMOGLOBIN g/dL 12.0   HEMATOCRIT % 35.8   PLATELETS 10*3/mm3 197     Results from last 7 days   Lab Units 04/03/22  0644   SODIUM mmol/L 141   POTASSIUM mmol/L 4.1   CHLORIDE mmol/L 106   CO2 mmol/L 27.4   BUN mg/dL 17   CREATININE mg/dL 1.29*   GLUCOSE mg/dL 97   CALCIUM mg/dL 9.1     Significant Diagnostic Studies:   WBC   Date Value Ref Range Status   04/03/2022 7.14 3.40 - 10.80 10*3/mm3 Final     Hemoglobin   Date Value Ref Range Status   04/03/2022 12.0 12.0 - 15.9 g/dL Final     Hematocrit   Date Value Ref Range Status   04/03/2022 35.8 34.0 - 46.6 % Final     Platelets   Date Value Ref Range Status   04/03/2022 197 140 - 450 10*3/mm3 Final     Sodium   Date Value Ref Range Status   04/03/2022 141 136 - 145 mmol/L Final     Potassium   Date Value Ref Range Status   04/03/2022 4.1 3.5 - 5.2 mmol/L Final     Chloride   Date Value Ref Range Status   04/03/2022 106 98 - 107 mmol/L Final     CO2   Date Value Ref Range Status   04/03/2022 27.4 22.0 - 29.0 mmol/L Final     BUN   Date Value Ref Range Status   04/03/2022 17 8 - 23 mg/dL Final     Creatinine   Date Value Ref Range Status   04/03/2022 1.29 (H) 0.57 - 1.00 mg/dL Final     Glucose   Date Value Ref Range Status   04/03/2022 97 65 - 99 mg/dL Final     Calcium   Date Value Ref Range Status   04/03/2022 9.1 8.6 - 10.5 mg/dL Final     No results found for: AST, ALT, ALKPHOS  INR   Date Value Ref Range Status   04/03/2022 1.93 (H) 0.90 - 1.10 Final     No results found for: COLORU, CLARITYU, SPECGRAV, PHUR, PROTEINUR, GLUCOSEU, KETONESU, BLOODU, NITRITE, LEUKOCYTESUR, BILIRUBINUR, UROBILINOGEN, RBCUA, WBCUA, BACTERIA, UACOMMENT  No results found for: TROPONINT, TROPONINI, BNP  No components found for: HGBA1C;2  No components found for:  TSH;2  Imaging Results (All)       Procedure Component Value Units Date/Time    XR Chest 1 View [[id number]] Collected: 04/01/22 1132     Updated: 04/01/22 1317    Narrative:      STAT PORTABLE VIEW OF THE CHEST 04/01/2022     CLINICAL HISTORY: Desaturation.     COMPARISON: This is correlated to portable chest x-ray 2 days ago on  03/29/2022.     FINDINGS:  There is a reverse right total shoulder arthroplasty  prosthesis in place.  There are multiple sternal wires and mediastinal  markers from previous cardiac surgery likely CABG. There is left  subclavian transvenous pacer in place, distal leads project over the  right atrium and right ventricular apex.  The cardiomediastinal  silhouette is upper limits of normal. There is mild vascular  engorgement, difficult to exclude minimal interstitial edema. There is  hazy increased density over the inferior left hemithorax felt to be  related to a small posterior layering left effusion.       Impression:      1. No significant change when compared to portable chest x-ray 2 days  ago on 03/29/2022. The patient has had a previous median sternotomy with  CABG and has a pacemaker in place and there is mild vascular engorgement  and there is persistent mild interstitial prominence in the lungs that  may be chronic although difficult to exclude minimal interstitial edema.  Hazy increased density over the inferior left hemithorax probably  secondary to posterior layering left pleural effusion.     This report was finalized on 4/1/2022 1:14 PM by Dr. Jack Ferrell M.D.       CT Angiogram Head w AI Analysis of LVO [[id number]] Collected: 03/29/22 1232     Updated: 03/30/22 0735    Narrative:      CT ANGIOGRAM NECK AND HEAD WITH CONTRAST AND CT PERFUSION WITH CONTRAST     HISTORY: Right-sided weakness, slurred speech, facial droop.     COMPARISON: None.     Initially, a noncontrasted CT examination of the brain was performed.  The study is hampered by patient motion. There is no  evidence of  intracranial hemorrhage, hydrocephalus or of abnormal extra-axial fluid.  No focal area of decreased attenuation to suggest acute infarction is  identified. The above information was called to and discussed with Dr. Vivas. Dr. Vivas requested further evaluation with CT perfusion and CT  angiography.     CT PERFUSION: There was no evidence of a focal perfusion defect.     CT ANGIOGRAM NECK AND HEAD WITH CONTRAST: The CT angiogram of the neck  and head was performed. Multiplanar as well as 3-dimensional  reconstructions were generated.     FINDINGS: The CT angiogram is limited by patient motion. The great  vessels are arranged in a bovine configuration. Vascular calcification  is appreciated involving the carotid bifurcations bilaterally but with  0% stenosis using NASCET criteria. Mild-to-moderate vascular  calcification involving the carotid siphons and supraclinoid ICAs are  appreciated bilaterally without high-grade stenosis. The proximal  aspects of the anterior and middle cerebral arteries appear  unremarkable.     Both vertebral arteries were opacified. The left vertebral artery is  slightly larger than that of the right and tortious proximally.  Evaluation of the ostium of the vertebral arteries is hampered by beam  hardening artifact from the patient's shoulder prosthesis, cardiac pacer  and by patient motion. The cervical segments of the vertebral arteries  are of relatively uniform caliber. The basilar artery and the proximal  aspects of the left posterior cerebral artery appear unremarkable. The  three-dimensional reconstructions demonstrate a mild-to-moderate  stenosis of the right P2 segment.     A standard postcontrast CT examination of the brain showed no evidence  of abnormal enhancement.       Impression:      The study is hampered by patient motion. There is no  evidence of acute infarction, intracranial hemorrhage or of abnormal  enhancement. Atherosclerotic disease is appreciated  involving the  carotid bifurcations but with 0% stenosis using NASCET criteria. The  three-dimensional reconstructions demonstrate a mild-to-moderate  stenosis of the right P2 segment. Otherwise, the proximal aspects of the  anterior, middle and posterior cerebral arteries appear unremarkable.  The origins of the vertebral arteries were not well visualized with  assessment hampered by beam hardening artifact and patient motion.     The noncontrasted CT examination of the brain was made available for  interpretation at 1132 hours and results called to Dr. iVvas at 1133  hours. The CT angiogram was made available for interpretation at 1149  hours and results called to Dr. Vivas at 1154 hours.           AI analysis of LVO was utilized.     Radiation dose reduction techniques were utilized, including automated  exposure control and exposure modulation based on body size.     This report was finalized on 3/30/2022 7:32 AM by Dr. Houston Hernandez M.D.       CT Angiogram Neck [[id number]] Collected: 03/29/22 1232     Updated: 03/30/22 0735    Narrative:      CT ANGIOGRAM NECK AND HEAD WITH CONTRAST AND CT PERFUSION WITH CONTRAST     HISTORY: Right-sided weakness, slurred speech, facial droop.     COMPARISON: None.     Initially, a noncontrasted CT examination of the brain was performed.  The study is hampered by patient motion. There is no evidence of  intracranial hemorrhage, hydrocephalus or of abnormal extra-axial fluid.  No focal area of decreased attenuation to suggest acute infarction is  identified. The above information was called to and discussed with Dr. Vivas. Dr. Vivas requested further evaluation with CT perfusion and CT  angiography.     CT PERFUSION: There was no evidence of a focal perfusion defect.     CT ANGIOGRAM NECK AND HEAD WITH CONTRAST: The CT angiogram of the neck  and head was performed. Multiplanar as well as 3-dimensional  reconstructions were generated.     FINDINGS: The CT angiogram is limited by  patient motion. The great  vessels are arranged in a bovine configuration. Vascular calcification  is appreciated involving the carotid bifurcations bilaterally but with  0% stenosis using NASCET criteria. Mild-to-moderate vascular  calcification involving the carotid siphons and supraclinoid ICAs are  appreciated bilaterally without high-grade stenosis. The proximal  aspects of the anterior and middle cerebral arteries appear  unremarkable.     Both vertebral arteries were opacified. The left vertebral artery is  slightly larger than that of the right and tortious proximally.  Evaluation of the ostium of the vertebral arteries is hampered by beam  hardening artifact from the patient's shoulder prosthesis, cardiac pacer  and by patient motion. The cervical segments of the vertebral arteries  are of relatively uniform caliber. The basilar artery and the proximal  aspects of the left posterior cerebral artery appear unremarkable. The  three-dimensional reconstructions demonstrate a mild-to-moderate  stenosis of the right P2 segment.     A standard postcontrast CT examination of the brain showed no evidence  of abnormal enhancement.       Impression:      The study is hampered by patient motion. There is no  evidence of acute infarction, intracranial hemorrhage or of abnormal  enhancement. Atherosclerotic disease is appreciated involving the  carotid bifurcations but with 0% stenosis using NASCET criteria. The  three-dimensional reconstructions demonstrate a mild-to-moderate  stenosis of the right P2 segment. Otherwise, the proximal aspects of the  anterior, middle and posterior cerebral arteries appear unremarkable.  The origins of the vertebral arteries were not well visualized with  assessment hampered by beam hardening artifact and patient motion.     The noncontrasted CT examination of the brain was made available for  interpretation at 1132 hours and results called to Dr. Vivas at 1133  hours. The CT angiogram was  made available for interpretation at 1149  hours and results called to Dr. Vivas at 1154 hours.           AI analysis of LVO was utilized.     Radiation dose reduction techniques were utilized, including automated  exposure control and exposure modulation based on body size.     This report was finalized on 3/30/2022 7:32 AM by Dr. Houston Hernandez M.D.       CT CEREBRAL PERFUSION WITH & WITHOUT CONTRAST [[id number]] Collected: 03/29/22 1232     Updated: 03/30/22 0735    Narrative:      CT ANGIOGRAM NECK AND HEAD WITH CONTRAST AND CT PERFUSION WITH CONTRAST     HISTORY: Right-sided weakness, slurred speech, facial droop.     COMPARISON: None.     Initially, a noncontrasted CT examination of the brain was performed.  The study is hampered by patient motion. There is no evidence of  intracranial hemorrhage, hydrocephalus or of abnormal extra-axial fluid.  No focal area of decreased attenuation to suggest acute infarction is  identified. The above information was called to and discussed with Dr. Vivas. Dr. Vivas requested further evaluation with CT perfusion and CT  angiography.     CT PERFUSION: There was no evidence of a focal perfusion defect.     CT ANGIOGRAM NECK AND HEAD WITH CONTRAST: The CT angiogram of the neck  and head was performed. Multiplanar as well as 3-dimensional  reconstructions were generated.     FINDINGS: The CT angiogram is limited by patient motion. The great  vessels are arranged in a bovine configuration. Vascular calcification  is appreciated involving the carotid bifurcations bilaterally but with  0% stenosis using NASCET criteria. Mild-to-moderate vascular  calcification involving the carotid siphons and supraclinoid ICAs are  appreciated bilaterally without high-grade stenosis. The proximal  aspects of the anterior and middle cerebral arteries appear  unremarkable.     Both vertebral arteries were opacified. The left vertebral artery is  slightly larger than that of the right and tortious  proximally.  Evaluation of the ostium of the vertebral arteries is hampered by beam  hardening artifact from the patient's shoulder prosthesis, cardiac pacer  and by patient motion. The cervical segments of the vertebral arteries  are of relatively uniform caliber. The basilar artery and the proximal  aspects of the left posterior cerebral artery appear unremarkable. The  three-dimensional reconstructions demonstrate a mild-to-moderate  stenosis of the right P2 segment.     A standard postcontrast CT examination of the brain showed no evidence  of abnormal enhancement.       Impression:      The study is hampered by patient motion. There is no  evidence of acute infarction, intracranial hemorrhage or of abnormal  enhancement. Atherosclerotic disease is appreciated involving the  carotid bifurcations but with 0% stenosis using NASCET criteria. The  three-dimensional reconstructions demonstrate a mild-to-moderate  stenosis of the right P2 segment. Otherwise, the proximal aspects of the  anterior, middle and posterior cerebral arteries appear unremarkable.  The origins of the vertebral arteries were not well visualized with  assessment hampered by beam hardening artifact and patient motion.     The noncontrasted CT examination of the brain was made available for  interpretation at 1132 hours and results called to Dr. Vivas at 1133  hours. The CT angiogram was made available for interpretation at 1149  hours and results called to Dr. Vivas at 1154 hours.           AI analysis of LVO was utilized.     Radiation dose reduction techniques were utilized, including automated  exposure control and exposure modulation based on body size.     This report was finalized on 3/30/2022 7:32 AM by Dr. Houston Hernandez M.D.       XR Chest 1 View [[id number]] Collected: 03/29/22 1218     Updated: 03/29/22 1229    Narrative:      XR CHEST 1 VW-  03/29/2022     HISTORY: Stroke protocol.     Heart size is mildly enlarged. Lungs are  underinflated with some  vascular congestion and mild interstitial disease. FINDINGS are  consistent with mild pulmonary edema. Cardiac pacemaker and sternotomy  wires are seen. Right shoulder prosthesis is seen. Small radiodensity  projects inferior to the left glenohumeral joint which could be artifact  or possibly a very densely calcified lymph node.       Impression:      1. FINDINGS consistent with mild pulmonary edema. There is mild  cardiomegaly.     This report was finalized on 3/29/2022 12:26 PM by Dr. Shabbir Gallagher M.D.             Lab Results (last 7 days)       Procedure Component Value Units Date/Time    POC Glucose Once [[id number]]  (Normal) Collected: 04/03/22 1146    Specimen: Blood Updated: 04/03/22 1148     Glucose 123 mg/dL      Comment: Meter: [id number] : [id number] Lito Bills  ECTOR       Basic Metabolic Panel [[id number]]  (Abnormal) Collected: 04/03/22 0644    Specimen: Blood Updated: 04/03/22 0811     Glucose 97 mg/dL      BUN 17 mg/dL      Creatinine 1.29 mg/dL      Sodium 141 mmol/L      Potassium 4.1 mmol/L      Chloride 106 mmol/L      CO2 27.4 mmol/L      Calcium 9.1 mg/dL      BUN/Creatinine Ratio 13.2     Anion Gap 7.6 mmol/L      eGFR 42.3 mL/min/1.73      Comment: National Kidney Foundation and American Society of Nephrology (ASN) Task Force recommended calculation based on the Chronic Kidney Disease Epidemiology Collaboration (CKD-EPI) equation refit without adjustment for race.       Narrative:      GFR Normal >60  Chronic Kidney Disease <60  Kidney Failure <15      Protime-INR [[id number]]  (Abnormal) Collected: 04/03/22 0644    Specimen: Blood Updated: 04/03/22 0800     Protime 22.1 Seconds      INR 1.93    CBC & Differential [[id number]]  (Normal) Collected: 04/03/22 0644    Specimen: Blood Updated: 04/03/22 0752    Narrative:      The following orders were created for panel order CBC & Differential.  Procedure                               Abnormality         Status                      ---------                               -----------         ------                     CBC Auto Differential[[id number]]        Normal              Final result                 Please view results for these tests on the individual orders.    CBC Auto Differential [[id number]]  (Normal) Collected: 04/03/22 0644    Specimen: Blood Updated: 04/03/22 0752     WBC 7.14 10*3/mm3      RBC 3.95 10*6/mm3      Hemoglobin 12.0 g/dL      Hematocrit 35.8 %      MCV 90.6 fL      MCH 30.4 pg      MCHC 33.5 g/dL      RDW 14.5 %      RDW-SD 47.9 fl      MPV 10.5 fL      Platelets 197 10*3/mm3      Neutrophil % 64.0 %      Lymphocyte % 23.2 %      Monocyte % 9.5 %      Eosinophil % 3.1 %      Basophil % 0.1 %      Immature Grans % 0.1 %      Neutrophils, Absolute 4.56 10*3/mm3      Lymphocytes, Absolute 1.66 10*3/mm3      Monocytes, Absolute 0.68 10*3/mm3      Eosinophils, Absolute 0.22 10*3/mm3      Basophils, Absolute 0.01 10*3/mm3      Immature Grans, Absolute 0.01 10*3/mm3      nRBC 0.0 /100 WBC     POC Glucose Once [[id number]]  (Normal) Collected: 04/03/22 0730    Specimen: Blood Updated: 04/03/22 0732     Glucose 93 mg/dL      Comment: Meter: [id number] : [id number] [employer]       POC Glucose Once [[id number]]  (Normal) Collected: 04/02/22 1718    Specimen: Blood Updated: 04/02/22 1719     Glucose 91 mg/dL      Comment: Meter: [id number] : [id number] [employer]       POC Glucose Once [[id number]]  (Normal) Collected: 04/02/22 1133    Specimen: Blood Updated: 04/02/22 1136     Glucose 109 mg/dL      Comment: Meter: [id number] : [id number] [employer]       Blood Gas, Arterial - [[id number]]  (Abnormal) Collected: 04/02/22 0945    Specimen: Arterial Blood Updated: 04/02/22 0947     Site Arterial: right radial     Kleber's Test Positive     pH, Arterial 7.344 pH units      pCO2, Arterial 52.4 mm Hg      pO2, Arterial 84.9 mm Hg      HCO3, Arterial 28.5 mmol/L      Base Excess,  Arterial 1.9 mmol/L      O2 Saturation Calculated 95.6 %      Barometric Pressure for Blood Gas 756.7 mmHg      Comment: [id number] Meter: [id number] : [id number] Chad Garcia        Modality CPAP     Flow Rate 5 lpm      [facility] 16     PEEP 10    Basic Metabolic Panel [[id number]]  (Abnormal) Collected: 04/02/22 0752    Specimen: Blood Updated: 04/02/22 0854     Glucose 106 mg/dL      BUN 14 mg/dL      Creatinine 1.11 mg/dL      Sodium 142 mmol/L      Potassium 4.7 mmol/L      Chloride 108 mmol/L      CO2 24.0 mmol/L      Calcium 8.9 mg/dL      BUN/Creatinine Ratio 12.6     Anion Gap 10.0 mmol/L      eGFR 50.7 mL/min/1.73      Comment: National Kidney Foundation and American Society of Nephrology (ASN) Task Force recommended calculation based on the Chronic Kidney Disease Epidemiology Collaboration (CKD-EPI) equation refit without adjustment for race.       Narrative:      GFR Normal >60  Chronic Kidney Disease <60  Kidney Failure <15      Protime-INR [[id number]]  (Abnormal) Collected: 04/02/22 0752    Specimen: Blood Updated: 04/02/22 0836     Protime 22.2 Seconds      INR 1.94    CBC & Differential [[id number]]  (Abnormal) Collected: 04/02/22 0752    Specimen: Blood Updated: 04/02/22 0827    Narrative:      The following orders were created for panel order CBC & Differential.  Procedure                               Abnormality         Status                     ---------                               -----------         ------                     CBC Auto Differential[[id number]]        Abnormal            Final result                 Please view results for these tests on the individual orders.    CBC Auto Differential [[id number]]  (Abnormal) Collected: 04/02/22 0752    Specimen: Blood Updated: 04/02/22 0827     WBC 8.14 10*3/mm3      RBC 4.26 10*6/mm3      Hemoglobin 12.8 g/dL      Hematocrit 40.8 %      MCV 95.8 fL      MCH 30.0 pg      MCHC 31.4 g/dL      RDW 14.2 %      RDW-SD 49.4 fl      MPV  "10.0 fL      Platelets 201 10*3/mm3      Neutrophil % 62.0 %      Lymphocyte % 24.1 %      Monocyte % 10.4 %      Eosinophil % 2.6 %      Basophil % 0.4 %      Immature Grans % 0.5 %      Neutrophils, Absolute 5.05 10*3/mm3      Lymphocytes, Absolute 1.96 10*3/mm3      Monocytes, Absolute 0.85 10*3/mm3      Eosinophils, Absolute 0.21 10*3/mm3      Basophils, Absolute 0.03 10*3/mm3      Immature Grans, Absolute 0.04 10*3/mm3      nRBC 0.0 /100 WBC     POC Glucose Once [[id number]]  (Normal) Collected: 04/02/22 0737    Specimen: Blood Updated: 04/02/22 0738     Glucose 90 mg/dL      Comment: Meter: [id number] : [id number] Lito Bills CNA       BNP [[id number]]  (Abnormal) Collected: 04/01/22 2152    Specimen: Blood Updated: 04/01/22 2252     proBNP 3,021.0 pg/mL     Narrative:      Among patients with dyspnea, NT-proBNP is highly sensitive for the detection of acute congestive heart failure. In addition NT-proBNP of <300 pg/ml effectively rules out acute congestive heart failure with 99% negative predictive value.    Results may be falsely decreased if patient taking Biotin.      Procalcitonin [[id number]]  (Normal) Collected: 04/01/22 2152    Specimen: Blood Updated: 04/01/22 2252     Procalcitonin 0.07 ng/mL     Narrative:      As a Marker for Sepsis (Non-Neonates):    1. <0.5 ng/mL represents a low risk of severe sepsis and/or septic shock.  2. >2 ng/mL represents a high risk of severe sepsis and/or septic shock.    As a Marker for Lower Respiratory Tract Infections that require antibiotic therapy:    PCT on Admission    Antibiotic Therapy       6-12 Hrs later    >0.5                Strongly Recommended  >0.25 - <0.5        Recommended   0.1 - 0.25          Discouraged              Remeasure/reassess PCT  <0.1                Strongly Discouraged     Remeasure/reassess PCT    As 28 day mortality risk marker: \"Change in Procalcitonin Result\" (>80% or <=80%) if Day 0 (or Day 1) and Day 4 values are available. " Refer to http://www.[facility]-pct-calculator.com    Change in PCT <=80%  A decrease of PCT levels below or equal to 80% defines a positive change in PCT test result representing a higher risk for 28-day all-cause mortality of patients diagnosed with severe sepsis for septic shock.    Change in PCT >80%  A decrease of PCT levels of more than 80% defines a negative change in PCT result representing a lower risk for 28-day all-cause mortality of patients diagnosed with severe sepsis or septic shock.       POC Glucose Once [[id number]]  (Normal) Collected: 04/01/22 2158    Specimen: Blood Updated: 04/01/22 2159     Glucose 121 mg/dL      Comment: Meter: [id number] : [id number] Abena JOHN       POC Glucose Once [[id number]]  (Normal) Collected: 04/01/22 1710    Specimen: Blood Updated: 04/01/22 1712     Glucose 114 mg/dL      Comment: Meter: [id number] : [id number] Lito Downstream  CHIOA       POC Glucose Once [[id number]]  (Normal) Collected: 04/01/22 1209    Specimen: Blood Updated: 04/01/22 1211     Glucose 96 mg/dL      Comment: Meter: [id number] : [id number] Gametime  CNA       Blood Gas, Arterial - [[id number]]  (Abnormal) Collected: 04/01/22 1043    Specimen: Arterial Blood Updated: 04/01/22 1047     Site Arterial: right radial     Kleber's Test Positive     pH, Arterial 7.293 pH units      Comment: 1040 nmccomas Meter: [id number] : nmshabbir Taylor NathanCritical:Notify ERIC licea (01-Apr-22 10:46:11)Read back ok        pCO2, Arterial 60.3 mm Hg      Comment: Critical:Notify ERIC licea (01-Apr-22 10:46:11)Read back ok        pO2, Arterial 194.6 mm Hg      HCO3, Arterial 29.2 mmol/L      Base Excess, Arterial 1.4 mmol/L      O2 Saturation Calculated 99.5 %      Barometric Pressure for Blood Gas 749.8 mmHg      Modality Cannula     Flow Rate 6 lpm      Rate 12 Breaths/minute     POC Glucose Once [[id number]]  (Normal) Collected: 04/01/22 1042    Specimen: Blood Updated:  04/01/22 1044     Glucose 128 mg/dL      Comment: Meter: [id number] : [id number] Phil Francis RN       POC Glucose Once [[id number]]  (Normal) Collected: 04/01/22 0748    Specimen: Blood Updated: 04/01/22 0749     Glucose 109 mg/dL      Comment: Meter: [id number] : [id number] Lito Analisabecca BARNEY       Basic Metabolic Panel [[id number]]  (Abnormal) Collected: 04/01/22 0628    Specimen: Blood Updated: 04/01/22 0734     Glucose 105 mg/dL      BUN 17 mg/dL      Creatinine 1.15 mg/dL      Sodium 142 mmol/L      Potassium 4.6 mmol/L      Chloride 109 mmol/L      CO2 26.2 mmol/L      Calcium 9.0 mg/dL      BUN/Creatinine Ratio 14.8     Anion Gap 6.8 mmol/L      eGFR 48.6 mL/min/1.73      Comment: National Kidney Foundation and American Society of Nephrology (ASN) Task Force recommended calculation based on the Chronic Kidney Disease Epidemiology Collaboration (CKD-EPI) equation refit without adjustment for race.       Narrative:      GFR Normal >60  Chronic Kidney Disease <60  Kidney Failure <15      Protime-INR [[id number]]  (Abnormal) Collected: 04/01/22 0628    Specimen: Blood Updated: 04/01/22 0725     Protime 22.6 Seconds      INR 1.99    POC Glucose Once [[id number]]  (Normal) Collected: 03/31/22 2112    Specimen: Blood Updated: 03/31/22 2113     Glucose 119 mg/dL      Comment: Meter: [id number] : [id number] Jose JOHN       POC Glucose Once [[id number]]  (Normal) Collected: 03/31/22 1734    Specimen: Blood Updated: 03/31/22 1737     Glucose 116 mg/dL      Comment: Meter: [id number] : [id number] Ravindra IBANEZ       Urine Culture - Urine, Urine, Catheter In/Out [[id number]]  (Normal) Collected: 03/30/22 1024    Specimen: Urine, Catheter In/Out Updated: 03/31/22 1432     Urine Culture No growth    POC Glucose Once [[id number]]  (Normal) Collected: 03/31/22 1301    Specimen: Blood Updated: 03/31/22 1302     Glucose 121 mg/dL      Comment: Meter: [id number] : [id number] Ravindra IBANEZ        POC Glucose Once [[id number]]  (Normal) Collected: 03/31/22 0841    Specimen: Blood Updated: 03/31/22 0852     Glucose 115 mg/dL      Comment: Meter: [id number] : [id number] Ravindra IBANEZ       Basic Metabolic Panel [[id number]]  (Abnormal) Collected: 03/31/22 0734    Specimen: Blood Updated: 03/31/22 0816     Glucose 112 mg/dL      BUN 17 mg/dL      Creatinine 1.21 mg/dL      Sodium 141 mmol/L      Potassium 4.7 mmol/L      Chloride 108 mmol/L      CO2 25.0 mmol/L      Calcium 8.8 mg/dL      BUN/Creatinine Ratio 14.0     Anion Gap 8.0 mmol/L      eGFR 45.7 mL/min/1.73      Comment: National Kidney Foundation and American Society of Nephrology (ASN) Task Force recommended calculation based on the Chronic Kidney Disease Epidemiology Collaboration (CKD-EPI) equation refit without adjustment for race.       Narrative:      GFR Normal >60  Chronic Kidney Disease <60  Kidney Failure <15      Protime-INR [[id number]]  (Abnormal) Collected: 03/31/22 0734    Specimen: Blood from Hand, Right Updated: 03/31/22 0759     Protime 21.2 Seconds      INR 1.83    CBC & Differential [[id number]]  (Normal) Collected: 03/31/22 0734    Specimen: Blood Updated: 03/31/22 0751    Narrative:      The following orders were created for panel order CBC & Differential.  Procedure                               Abnormality         Status                     ---------                               -----------         ------                     CBC Auto Differential[[id number]]        Normal              Final result                 Please view results for these tests on the individual orders.    CBC Auto Differential [[id number]]  (Normal) Collected: 03/31/22 0734    Specimen: Blood Updated: 03/31/22 0751     WBC 8.63 10*3/mm3      RBC 4.11 10*6/mm3      Hemoglobin 12.5 g/dL      Hematocrit 38.2 %      MCV 92.9 fL      MCH 30.4 pg      MCHC 32.7 g/dL      RDW 14.3 %      RDW-SD 48.1 fl      MPV 9.8 fL      Platelets 210 10*3/mm3       Neutrophil % 67.6 %      Lymphocyte % 21.3 %      Monocyte % 8.3 %      Eosinophil % 2.3 %      Basophil % 0.2 %      Immature Grans % 0.3 %      Neutrophils, Absolute 5.82 10*3/mm3      Lymphocytes, Absolute 1.84 10*3/mm3      Monocytes, Absolute 0.72 10*3/mm3      Eosinophils, Absolute 0.20 10*3/mm3      Basophils, Absolute 0.02 10*3/mm3      Immature Grans, Absolute 0.03 10*3/mm3      nRBC 0.0 /100 WBC     POC Glucose Once [[id number]]  (Normal) Collected: 03/30/22 2052    Specimen: Blood Updated: 03/30/22 2053     Glucose 96 mg/dL      Comment: Meter: [id number] : [id number] Jose JOHN       POC Glucose Once [[id number]]  (Normal) Collected: 03/30/22 1643    Specimen: Blood Updated: 03/30/22 1645     Glucose 110 mg/dL      Comment: Meter: [id number] : [id number] Ravindra Defywire SHAMAR       POC Glucose Once [[id number]]  (Normal) Collected: 03/30/22 1306    Specimen: Blood Updated: 03/30/22 1325     Glucose 102 mg/dL      Comment: Meter: [id number] : [id number] Ravindra Defywire SHAMAR       Basic Metabolic Panel [[id number]]  (Abnormal) Collected: 03/30/22 1224    Specimen: Blood Updated: 03/30/22 1300     Glucose 111 mg/dL      BUN 15 mg/dL      Creatinine 1.04 mg/dL      Sodium 142 mmol/L      Potassium 4.6 mmol/L      Chloride 110 mmol/L      CO2 26.0 mmol/L      Calcium 8.8 mg/dL      BUN/Creatinine Ratio 14.4     Anion Gap 6.0 mmol/L      eGFR 54.8 mL/min/1.73      Comment: National Kidney Foundation and American Society of Nephrology (ASN) Task Force recommended calculation based on the Chronic Kidney Disease Epidemiology Collaboration (CKD-EPI) equation refit without adjustment for race.       Narrative:      GFR Normal >60  Chronic Kidney Disease <60  Kidney Failure <15      Protime-INR [[id number]]  (Abnormal) Collected: 03/30/22 1224    Specimen: Blood Updated: 03/30/22 1243     Protime 22.5 Seconds      INR 1.98    Urinalysis With Culture If Indicated - Urine, Catheter In/Out [[id number]]   (Abnormal) Collected: 03/30/22 1024    Specimen: Urine, Catheter In/Out Updated: 03/30/22 1051     Color, UA Yellow     Appearance, UA Clear     pH, UA 6.5     Specific Gravity, UA 1.014     Glucose, UA Negative     Ketones, UA Negative     Bilirubin, UA Negative     Blood, UA Negative     Protein, UA Negative     Leuk Esterase, UA Trace     Nitrite, UA Negative     Urobilinogen, UA 0.2 E.U./dL    Urinalysis, Microscopic Only - Urine, Catheter In/Out [[id number]]  (Abnormal) Collected: 03/30/22 1024    Specimen: Urine, Catheter In/Out Updated: 03/30/22 1051     RBC, UA 0-2 /HPF      WBC, UA 6-12 /HPF      Bacteria, UA 1+ /HPF      Squamous Epithelial Cells, UA 7-12 /HPF      Hyaline Casts, UA 0-2 /LPF      Methodology Automated Microscopy    Comprehensive Metabolic Panel [[id number]]  (Abnormal) Collected: 03/30/22 0755    Specimen: Blood Updated: 03/30/22 0845     Glucose 102 mg/dL      BUN 16 mg/dL      Creatinine 0.90 mg/dL      Sodium 151 mmol/L      Potassium 4.9 mmol/L      Chloride 107 mmol/L      CO2 26.1 mmol/L      Calcium 8.8 mg/dL      Total Protein 6.4 g/dL      Albumin 3.30 g/dL      ALT (SGPT) 22 U/L      AST (SGOT) 18 U/L      Alkaline Phosphatase 61 U/L      Total Bilirubin 0.5 mg/dL      Globulin 3.1 gm/dL      A/G Ratio 1.1 g/dL      BUN/Creatinine Ratio 17.8     Anion Gap 17.9 mmol/L      eGFR 65.2 mL/min/1.73      Comment: National Kidney Foundation and American Society of Nephrology (ASN) Task Force recommended calculation based on the Chronic Kidney Disease Epidemiology Collaboration (CKD-EPI) equation refit without adjustment for race.       Narrative:      GFR Normal >60  Chronic Kidney Disease <60  Kidney Failure <15      POC Glucose Once [[id number]]  (Normal) Collected: 03/30/22 0814    Specimen: Blood Updated: 03/30/22 0833     Glucose 93 mg/dL      Comment: Meter: [id number] : [id number] Ravindra Kalyynn IBANEZ       CBC Auto Differential [[id number]]  (Normal) Collected: 03/30/22 0755     Specimen: Blood Updated: 03/30/22 0829     WBC 5.92 10*3/mm3      RBC 4.06 10*6/mm3      Hemoglobin 12.3 g/dL      Hematocrit 37.4 %      MCV 92.1 fL      MCH 30.3 pg      MCHC 32.9 g/dL      RDW 14.5 %      RDW-SD 48.6 fl      MPV 10.1 fL      Platelets 213 10*3/mm3      Neutrophil % 62.8 %      Lymphocyte % 24.5 %      Monocyte % 8.4 %      Eosinophil % 3.7 %      Basophil % 0.3 %      Immature Grans % 0.3 %      Neutrophils, Absolute 3.71 10*3/mm3      Lymphocytes, Absolute 1.45 10*3/mm3      Monocytes, Absolute 0.50 10*3/mm3      Eosinophils, Absolute 0.22 10*3/mm3      Basophils, Absolute 0.02 10*3/mm3      Immature Grans, Absolute 0.02 10*3/mm3      nRBC 0.0 /100 WBC     POC Glucose Once [[id number]]  (Normal) Collected: 03/30/22 0643    Specimen: Blood Updated: 03/30/22 0644     Glucose 113 mg/dL      Comment: Meter: [id number] : [id number] Abena JOHN       P[place]rew Draw [[id number]] Collected: 03/29/22 1127    Specimen: Blood Updated: 03/29/22 1333    Narrative:      The following orders were created for panel order [place] Draw.  Procedure                               Abnormality         Status                     ---------                               -----------         ------                     Green Top (Gel)[[id number]]                                  Final result               Lavender Top[[id number]]                                     Final result               Gold Top - SST[[id number]]                                   Final result               Light Blue Top[[id number]]                                   Final result                 Please view results for these tests on the individual orders.    Gold Top - SST [[id number]] Collected: 03/29/22 1224    Specimen: Blood Updated: 03/29/22 1333     Extra Tube Hold for add-ons.     Comment: Auto resulted.       BNP [[id number]]  (Normal) Collected: 03/29/22 1225    Specimen: Blood Updated: 03/29/22 1326     proBNP 735.0 pg/mL     Narrative:       Among patients with dyspnea, NT-proBNP is highly sensitive for the detection of acute congestive heart failure. In addition NT-proBNP of <300 pg/ml effectively rules out acute congestive heart failure with 99% negative predictive value.    Results may be falsely decreased if patient taking Biotin.      Comprehensive Metabolic Panel [[id number]]  (Abnormal) Collected: 03/29/22 1225    Specimen: Blood Updated: 03/29/22 1259     Glucose 99 mg/dL      BUN 19 mg/dL      Creatinine 1.25 mg/dL      Sodium 139 mmol/L      Potassium 5.6 mmol/L      Chloride 105 mmol/L      CO2 27.0 mmol/L      Calcium 9.1 mg/dL      Total Protein 6.8 g/dL      Albumin 3.90 g/dL      ALT (SGPT) 12 U/L      AST (SGOT) 18 U/L      Alkaline Phosphatase 65 U/L      Total Bilirubin 0.4 mg/dL      Globulin 2.9 gm/dL      A/G Ratio 1.3 g/dL      BUN/Creatinine Ratio 15.2     Anion Gap 7.0 mmol/L      eGFR 43.9 mL/min/1.73      Comment: National Kidney Foundation and American Society of Nephrology (ASN) Task Force recommended calculation based on the Chronic Kidney Disease Epidemiology Collaboration (CKD-EPI) equation refit without adjustment for race.       Narrative:      GFR Normal >60  Chronic Kidney Disease <60  Kidney Failure <15      COVID PRE-OP / PRE-PROCEDURE SCREENING ORDER (NO ISOLATION) - Swab, Nasopharynx [[id number]]  (Normal) Collected: 03/29/22 1207    Specimen: Swab from Nasopharynx Updated: 03/29/22 1235    Narrative:      The following orders were created for panel order COVID PRE-OP / PRE-PROCEDURE SCREENING ORDER (NO ISOLATION) - Swab, Nasopharynx.  Procedure                               Abnormality         Status                     ---------                               -----------         ------                     COVID-19,[facility] IN-HOUSE...[[id number]]  Normal              Final result                 Please view results for these tests on the individual orders.    COVID-19,[facility] IN-HOUSE CEPHEID/LILY NP SWAB IN  TRANSPORT MEDIA 8-12 HR TAT - Swab, Nasopharynx [[id number]]  (Normal) Collected: 03/29/22 1207    Specimen: Swab from Nasopharynx Updated: 03/29/22 1235     COVID19 Not Detected    Narrative:      Fact sheet for providers: https://www.fda.gov/media/101496/download    Fact sheet for patients: https://www.fda.gov/media/455155/download    Test performed by PCR.    Lavender Top [[id number]] Collected: 03/29/22 1127    Specimen: Blood Updated: 03/29/22 1233     Extra Tube hold for add-on     Comment: Auto resulted       Green Top (Gel) [[id number]] Collected: 03/29/22 1127    Specimen: Blood Updated: 03/29/22 1233     Extra Tube Hold for add-ons.     Comment: Auto resulted.       Light Blue Top [[id number]] Collected: 03/29/22 1127    Specimen: Blood Updated: 03/29/22 1233     Extra Tube hold for add-on     Comment: Auto resulted       Troponin [[id number]]  (Normal) Collected: 03/29/22 1127    Specimen: Blood Updated: 03/29/22 1152     Troponin T <0.010 ng/mL     Narrative:      Troponin T Reference Range:  <= 0.03 ng/mL-   Negative for AMI  >0.03 ng/mL-     Abnormal for myocardial necrosis.  Clinicians would have to utilize clinical acumen, EKG, Troponin and serial changes to determine if it is an Acute Myocardial Infarction or myocardial injury due to an underlying chronic condition.       Results may be falsely decreased if patient taking Biotin.      aPTT [[id number]]  (Normal) Collected: 03/29/22 1127    Specimen: Blood Updated: 03/29/22 1143     PTT 32.3 seconds     Protime-INR [[id number]]  (Abnormal) Collected: 03/29/22 1127    Specimen: Blood Updated: 03/29/22 1143     Protime 25.9 Seconds      INR 2.37    CBC & Differential [[id number]]  (Normal) Collected: 03/29/22 1127    Specimen: Blood Updated: 03/29/22 1133    Narrative:      The following orders were created for panel order CBC & Differential.  Procedure                               Abnormality         Status                     ---------                        "        -----------         ------                     CBC Auto Differential[[id number]]        Normal              Final result                 Please view results for these tests on the individual orders.    CBC Auto Differential [[id number]]  (Normal) Collected: 03/29/22 1127    Specimen: Blood Updated: 03/29/22 1133     WBC 7.04 10*3/mm3      RBC 4.25 10*6/mm3      Hemoglobin 13.0 g/dL      Hematocrit 40.1 %      MCV 94.4 fL      MCH 30.6 pg      MCHC 32.4 g/dL      RDW 14.7 %      RDW-SD 50.8 fl      MPV 11.1 fL      Platelets 305 10*3/mm3      Neutrophil % 59.6 %      Lymphocyte % 28.0 %      Monocyte % 8.1 %      Eosinophil % 3.7 %      Basophil % 0.3 %      Immature Grans % 0.3 %      Neutrophils, Absolute 4.20 10*3/mm3      Lymphocytes, Absolute 1.97 10*3/mm3      Monocytes, Absolute 0.57 10*3/mm3      Eosinophils, Absolute 0.26 10*3/mm3      Basophils, Absolute 0.02 10*3/mm3      Immature Grans, Absolute 0.02 10*3/mm3      nRBC 0.0 /100 WBC           /62 (BP Location: Right arm, Patient Position: Lying)   Pulse 71   Temp 99.1 °F (37.3 °C) (Oral)   Resp 18   Ht 157.5 cm (62\")   Wt 96.6 kg (213 lb)   SpO2 94%   BMI 38.96 kg/m²     Discharge Exam:  General Appearance:    Alert, cooperative, no distress                          Head:    Normocephalic, without obvious abnormality, atraumatic                          Eyes:                            Throat:   Lips, tongue, gums normal                          Neck:   Supple, symmetrical, trachea midline, no JVD                        Lungs:     Clear to auscultation bilaterally, respirations unlabored                Chest Wall:    No tenderness or deformity                        Heart:    Regular rate and rhythm, S1 and S2 normal, no murmur,no  Rub or gallop                  Abdomen:     Soft, non-tender, bowel sounds active, no masses, no organomegaly                  Extremities:   Extremities normal, atraumatic, no cyanosis or edema                "              Skin:   Skin is warm and dry,  no rashes or palpable lesions                  Neurologic:   no focal deficits noted     Disposition:  Skilled nursing facility    Patient Instructions:      Discharge Medications        New Medications        Instructions Start Date   acetaminophen 325 MG tablet  Commonly known as: TYLENOL   650 mg, Oral, Every 4 Hours PRN      atorvastatin 40 MG tablet  Commonly known as: LIPITOR   40 mg, Oral, Daily   Start Date: April 4, 2022     hydrOXYzine 25 MG tablet  Commonly known as: ATARAX   25 mg, Oral, 3 Times Daily PRN             Continue These Medications        Instructions Start Date   metFORMIN 500 MG tablet  Commonly known as: GLUCOPHAGE   500 mg, Oral, 2 Times Daily      nitroglycerin 0.4 MG/SPRAY spray  Commonly known as: NITROLINGUAL   1 spray, Sublingual, Every 5 Minutes PRN      nystatin 825714 UNIT/GM powder  Commonly known as: MYCOSTATIN   Topical, 2 Times Daily, To pannus, under breasts      omeprazole 40 MG capsule  Commonly known as: priLOSEC   40 mg, Oral, Nightly, Delayed release      risperiDONE 0.25 MG tablet  Commonly known as: risperDAL   0.25 mg, Oral, 2 Times Daily      warfarin 2.5 MG tablet  Commonly known as: COUMADIN   2.5 mg, Oral, 4 Times Weekly, Warfarin 2.5mg every Tue, Thu, Sat, Sun      warfarin 3 MG tablet  Commonly known as: COUMADIN   3 mg, Oral, 3 Times Weekly, Warfarin 3mg every Mon, Wed, Fri             Stop These Medications      clonazePAM 0.5 MG tablet  Commonly known as: KlonoPIN            ASK your doctor about these medications        Instructions Start Date   sotalol 80 MG tablet  Commonly known as: BETAPACE   40 mg, Oral, 2 Times Daily             No future appointments.   Contact information for follow-up providers       Luis Williamson MD Follow up.    Specialty: Internal Medicine  Why: After released from rehab.  Contact information:  [id number]  [facility] [address] [phone]                       Contact  information for after-discharge care       Destination       [facility] .    Service: Skilled Nursing  Contact information:  [id number]  [facility] [address] [phone]                                 Discharge Order (From admission, onward)       Start     Ordered    04/03/22 1413  Discharge patient  Once        Expected Discharge Date: 04/03/22    Discharge Disposition: Skilled Nursing Facility (DC - External)    Physician of Record for Attribution - Please select from Treatment Team: ALEXANDRE RAMSEY [[id number]]    Review needed by CMO to determine Physician of Record: No       Question Answer Comment   Physician of Record for Attribution - Please select from Treatment Team ALEXANDRE RAMSEY    Review needed by CMO to determine Physician of Record No        04/03/22 1416                    Total time spent discharging patient including evaluation,post hospitalization follow up,  medication and post hospitalization instructions and education total time exceeds 30 minutes.    Signed:  Alexandre Ramsey MD  4/3/2022  14:17 EDT

## 2022-04-03 NOTE — PROGRESS NOTES
[facility]: Warfarin Dosing/Monitoring Consult    Tiara Hernandes is a 81 y.o. female, estimated creatinine clearance is 43 mL/min (A) (by C-G formula based on SCr of 1.15 mg/dL (H)). weighing 96.6 kg (213 lb).    Results from last 7 days   Lab Units 04/03/22  0644 04/02/22  0752 04/01/22  0628 03/31/22  0734 03/30/22  1224 03/30/22  0755 03/29/22  1127   INR  1.93* 1.94* 1.99* 1.83* 1.98*  --  2.37*   APTT seconds  --   --   --   --   --   --  32.3   HEMOGLOBIN g/dL 12.0 12.8  --  12.5  --  12.3 13.0   HEMATOCRIT % 35.8 40.8  --  38.2  --  37.4 40.1   PLATELETS 10*3/mm3 197 201  --  210  --  213 305     Prior to admission anticoagulation: 3mg MWF with 2.5mg TTSS    Hospital Anticoagulation:  Consulting provider: Sarath  Start date: 3/30/22  Indication: Atrial Fibrillation - requiring full anticoagulation  Target INR: 2 - 3  Expected duration: indefinite  Bridge Therapy: no    Potential food or drug interactions: none    Education complete?/Date: No; plan for follow up TBD    Assessment/Plan:  Dose: INR today at 1.93 (slightly sub therapeutic ) will continue her home regimen and give her regularly scheduled dose of 2.5mg today with an additional 1 time dose of 2mg to equal total of 4.5mg  Monitor for any signs or symptoms of bleeding  Follow up daily INRs and dose adjustments    Pharmacy will continue to follow until discharge or discontinuation of warfarin.     Radha Morgan, [facility]    Clinical Pharmacist

## 2022-04-03 NOTE — PROGRESS NOTES
"DAILY PROGRESS NOTE  [facility]    Patient Identification:  Name: Tiara Hernandes  Age: 81 y.o.  Sex: female  :  1940  MRN: [id number]         Primary Care Physician: Luis Williamson MD    Subjective:  Interval History:She is very weak . Confused.    Objective:    Scheduled Meds:atorvastatin, 40 mg, Oral, Daily  nystatin, , Topical, BID  pantoprazole, 40 mg, Oral, QAM  risperiDONE, 0.25 mg, Oral, BID  sodium chloride, 10 mL, Intravenous, Q12H  sotalol, 40 mg, Oral, BID  warfarin, 2 mg, Oral, Once  warfarin, 2.5 mg, Oral, Once per day on Sun Tue Thu Sat  warfarin, 3 mg, Oral, Once per day on       Continuous Infusions:Pharmacy to dose warfarin,         Vital signs in last 24 hours:  Temp:  [97.8 °F (36.6 °C)-98.4 °F (36.9 °C)] 98 °F (36.7 °C)  Heart Rate:  [59-81] 81  Resp:  [16-20] 16  BP: (111-140)/(60-74) 111/68    Intake/Output:    Intake/Output Summary (Last 24 hours) at 4/3/2022 1250  Last data filed at 4/3/2022 0900  Gross per 24 hour   Intake 120 ml   Output --   Net 120 ml       Exam:  /68 (BP Location: Right arm, Patient Position: Lying)   Pulse 81   Temp 98 °F (36.7 °C) (Oral)   Resp 16   Ht 157.5 cm (62\")   Wt 96.6 kg (213 lb)   SpO2 96%   BMI 38.96 kg/m²     General Appearance:    confused, no distress   Head:    Normocephalic, without obvious abnormality, atraumatic   Eyes:       Throat:   Lips, tongue, gums normal   Neck:   Supple, symmetrical, trachea midline, no JVD   Lungs:     Clear to auscultation bilaterally, respirations unlabored   Chest Wall:    No tenderness or deformity    Heart:    Regular rate and rhythm, S1 and S2 normal, no murmur,no  Rub or gallop   Abdomen:     Soft, nontender, bowel sounds active, no masses, no organomegaly    Extremities:   Extremities normal, atraumatic, no cyanosis or edema   Pulses:      Skin:   Skin is warm and dry,  no rashes or palpable lesions   Neurologic:   no focal deficits noted, confused      Lab Results " (last 72 hours)     Procedure Component Value Units Date/Time    Blood Gas, Arterial - [[id number]]  (Abnormal) Collected: 04/01/22 1043    Specimen: Arterial Blood Updated: 04/01/22 1047     Site Arterial: right radial     Kleber's Test Positive     pH, Arterial 7.293 pH units      Comment: 1040 nmccavila Meter: [id number] : sancho Taylor NathanCritical:Notify ERIC licea (01-Apr-22 10:46:11)Read back ok        pCO2, Arterial 60.3 mm Hg      Comment: Critical:Notify ERIC licea (01-Apr-22 10:46:11)Read back ok        pO2, Arterial 194.6 mm Hg      HCO3, Arterial 29.2 mmol/L      Base Excess, Arterial 1.4 mmol/L      O2 Saturation Calculated 99.5 %      Barometric Pressure for Blood Gas 749.8 mmHg      Modality Cannula     Flow Rate 6 lpm      Rate 12 Breaths/minute     POC Glucose Once [[id number]]  (Normal) Collected: 04/01/22 1042    Specimen: Blood Updated: 04/01/22 1044     Glucose 128 mg/dL      Comment: Meter: [id number] : [id number] Phil Francis RN       POC Glucose Once [[id number]]  (Normal) Collected: 04/01/22 0748    Specimen: Blood Updated: 04/01/22 0749     Glucose 109 mg/dL      Comment: Meter: [id number] : [id number] Lito Bills CNA       Basic Metabolic Panel [[id number]]  (Abnormal) Collected: 04/01/22 0628    Specimen: Blood Updated: 04/01/22 0734     Glucose 105 mg/dL      BUN 17 mg/dL      Creatinine 1.15 mg/dL      Sodium 142 mmol/L      Potassium 4.6 mmol/L      Chloride 109 mmol/L      CO2 26.2 mmol/L      Calcium 9.0 mg/dL      BUN/Creatinine Ratio 14.8     Anion Gap 6.8 mmol/L      eGFR 48.6 mL/min/1.73      Comment: National Kidney Foundation and American Society of Nephrology (ASN) Task Force recommended calculation based on the Chronic Kidney Disease Epidemiology Collaboration (CKD-EPI) equation refit without adjustment for race.       Narrative:      GFR Normal >60  Chronic Kidney Disease <60  Kidney Failure <15      Protime-INR [[id number]]  (Abnormal)  Collected: 04/01/22 0628    Specimen: Blood Updated: 04/01/22 0725     Protime 22.6 Seconds      INR 1.99    POC Glucose Once [[id number]]  (Normal) Collected: 03/31/22 2112    Specimen: Blood Updated: 03/31/22 2113     Glucose 119 mg/dL      Comment: Meter: [id number] : [id number] Jose JOHN       POC Glucose Once [[id number]]  (Normal) Collected: 03/31/22 1734    Specimen: Blood Updated: 03/31/22 1737     Glucose 116 mg/dL      Comment: Meter: [id number] : [id number] Ravindra Kaylynn SHAMAR       Urine Culture - Urine, Urine, Catheter In/Out [[id number]]  (Normal) Collected: 03/30/22 1024    Specimen: Urine, Catheter In/Out Updated: 03/31/22 1432     Urine Culture No growth    POC Glucose Once [[id number]]  (Normal) Collected: 03/31/22 1301    Specimen: Blood Updated: 03/31/22 1302     Glucose 121 mg/dL      Comment: Meter: [id number] : [id number] Ravindra Kaylynn SHAMAR       POC Glucose Once [[id number]]  (Normal) Collected: 03/31/22 0841    Specimen: Blood Updated: 03/31/22 0852     Glucose 115 mg/dL      Comment: Meter: [id number] : [id number] Ravindra Kaylynn SHAMAR       Basic Metabolic Panel [[id number]]  (Abnormal) Collected: 03/31/22 0734    Specimen: Blood Updated: 03/31/22 0816     Glucose 112 mg/dL      BUN 17 mg/dL      Creatinine 1.21 mg/dL      Sodium 141 mmol/L      Potassium 4.7 mmol/L      Chloride 108 mmol/L      CO2 25.0 mmol/L      Calcium 8.8 mg/dL      BUN/Creatinine Ratio 14.0     Anion Gap 8.0 mmol/L      eGFR 45.7 mL/min/1.73      Comment: National Kidney Foundation and American Society of Nephrology (ASN) Task Force recommended calculation based on the Chronic Kidney Disease Epidemiology Collaboration (CKD-EPI) equation refit without adjustment for race.       Narrative:      GFR Normal >60  Chronic Kidney Disease <60  Kidney Failure <15      Protime-INR [[id number]]  (Abnormal) Collected: 03/31/22 0734    Specimen: Blood from Hand, Right Updated: 03/31/22 0759     Protime 21.2 Seconds       INR 1.83    CBC & Differential [[id number]]  (Normal) Collected: 03/31/22 0734    Specimen: Blood Updated: 03/31/22 0751    Narrative:      The following orders were created for panel order CBC & Differential.  Procedure                               Abnormality         Status                     ---------                               -----------         ------                     CBC Auto Differential[[id number]]        Normal              Final result                 Please view results for these tests on the individual orders.    CBC Auto Differential [[id number]]  (Normal) Collected: 03/31/22 0734    Specimen: Blood Updated: 03/31/22 0751     WBC 8.63 10*3/mm3      RBC 4.11 10*6/mm3      Hemoglobin 12.5 g/dL      Hematocrit 38.2 %      MCV 92.9 fL      MCH 30.4 pg      MCHC 32.7 g/dL      RDW 14.3 %      RDW-SD 48.1 fl      MPV 9.8 fL      Platelets 210 10*3/mm3      Neutrophil % 67.6 %      Lymphocyte % 21.3 %      Monocyte % 8.3 %      Eosinophil % 2.3 %      Basophil % 0.2 %      Immature Grans % 0.3 %      Neutrophils, Absolute 5.82 10*3/mm3      Lymphocytes, Absolute 1.84 10*3/mm3      Monocytes, Absolute 0.72 10*3/mm3      Eosinophils, Absolute 0.20 10*3/mm3      Basophils, Absolute 0.02 10*3/mm3      Immature Grans, Absolute 0.03 10*3/mm3      nRBC 0.0 /100 WBC     POC Glucose Once [[id number]]  (Normal) Collected: 03/30/22 2052    Specimen: Blood Updated: 03/30/22 2053     Glucose 96 mg/dL      Comment: Meter: [id number] : [id number] Jose JOHN       POC Glucose Once [[id number]]  (Normal) Collected: 03/30/22 1643    Specimen: Blood Updated: 03/30/22 1645     Glucose 110 mg/dL      Comment: Meter: [id number] : [id number] Ravindra [employer] SHAMAR       POC Glucose Once [[id number]]  (Normal) Collected: 03/30/22 1306    Specimen: Blood Updated: 03/30/22 1325     Glucose 102 mg/dL      Comment: Meter: [id number] : [id number] Ravindra Kaylynn SHAMAR       Basic Metabolic Panel [[id number]]  (Abnormal)  Collected: 03/30/22 1224    Specimen: Blood Updated: 03/30/22 1300     Glucose 111 mg/dL      BUN 15 mg/dL      Creatinine 1.04 mg/dL      Sodium 142 mmol/L      Potassium 4.6 mmol/L      Chloride 110 mmol/L      CO2 26.0 mmol/L      Calcium 8.8 mg/dL      BUN/Creatinine Ratio 14.4     Anion Gap 6.0 mmol/L      eGFR 54.8 mL/min/1.73      Comment: National Kidney Foundation and American Society of Nephrology (ASN) Task Force recommended calculation based on the Chronic Kidney Disease Epidemiology Collaboration (CKD-EPI) equation refit without adjustment for race.       Narrative:      GFR Normal >60  Chronic Kidney Disease <60  Kidney Failure <15      Protime-INR [[id number]]  (Abnormal) Collected: 03/30/22 1224    Specimen: Blood Updated: 03/30/22 1243     Protime 22.5 Seconds      INR 1.98    Urinalysis With Culture If Indicated - Urine, Catheter In/Out [[id number]]  (Abnormal) Collected: 03/30/22 1024    Specimen: Urine, Catheter In/Out Updated: 03/30/22 1051     Color, UA Yellow     Appearance, UA Clear     pH, UA 6.5     Specific Gravity, UA 1.014     Glucose, UA Negative     Ketones, UA Negative     Bilirubin, UA Negative     Blood, UA Negative     Protein, UA Negative     Leuk Esterase, UA Trace     Nitrite, UA Negative     Urobilinogen, UA 0.2 E.U./dL    Urinalysis, Microscopic Only - Urine, Catheter In/Out [[id number]]  (Abnormal) Collected: 03/30/22 1024    Specimen: Urine, Catheter In/Out Updated: 03/30/22 1051     RBC, UA 0-2 /HPF      WBC, UA 6-12 /HPF      Bacteria, UA 1+ /HPF      Squamous Epithelial Cells, UA 7-12 /HPF      Hyaline Casts, UA 0-2 /LPF      Methodology Automated Microscopy    Comprehensive Metabolic Panel [[id number]]  (Abnormal) Collected: 03/30/22 0755    Specimen: Blood Updated: 03/30/22 0845     Glucose 102 mg/dL      BUN 16 mg/dL      Creatinine 0.90 mg/dL      Sodium 151 mmol/L      Potassium 4.9 mmol/L      Chloride 107 mmol/L      CO2 26.1 mmol/L      Calcium 8.8 mg/dL      Total  Protein 6.4 g/dL      Albumin 3.30 g/dL      ALT (SGPT) 22 U/L      AST (SGOT) 18 U/L      Alkaline Phosphatase 61 U/L      Total Bilirubin 0.5 mg/dL      Globulin 3.1 gm/dL      A/G Ratio 1.1 g/dL      BUN/Creatinine Ratio 17.8     Anion Gap 17.9 mmol/L      eGFR 65.2 mL/min/1.73      Comment: National Kidney Foundation and American Society of Nephrology (ASN) Task Force recommended calculation based on the Chronic Kidney Disease Epidemiology Collaboration (CKD-EPI) equation refit without adjustment for race.       Narrative:      GFR Normal >60  Chronic Kidney Disease <60  Kidney Failure <15      POC Glucose Once [[id number]]  (Normal) Collected: 03/30/22 0814    Specimen: Blood Updated: 03/30/22 0833     Glucose 93 mg/dL      Comment: Meter: [id number] : [id number] Ravindra IBANEZ       CBC Auto Differential [[id number]]  (Normal) Collected: 03/30/22 0755    Specimen: Blood Updated: 03/30/22 0829     WBC 5.92 10*3/mm3      RBC 4.06 10*6/mm3      Hemoglobin 12.3 g/dL      Hematocrit 37.4 %      MCV 92.1 fL      MCH 30.3 pg      MCHC 32.9 g/dL      RDW 14.5 %      RDW-SD 48.6 fl      MPV 10.1 fL      Platelets 213 10*3/mm3      Neutrophil % 62.8 %      Lymphocyte % 24.5 %      Monocyte % 8.4 %      Eosinophil % 3.7 %      Basophil % 0.3 %      Immature Grans % 0.3 %      Neutrophils, Absolute 3.71 10*3/mm3      Lymphocytes, Absolute 1.45 10*3/mm3      Monocytes, Absolute 0.50 10*3/mm3      Eosinophils, Absolute 0.22 10*3/mm3      Basophils, Absolute 0.02 10*3/mm3      Immature Grans, Absolute 0.02 10*3/mm3      nRBC 0.0 /100 WBC     POC Glucose Once [[id number]]  (Normal) Collected: 03/30/22 0643    Specimen: Blood Updated: 03/30/22 0644     Glucose 113 mg/dL      Comment: Meter: [id number] : [id number] Abena JOHN       [place] Draw [[id number]] Collected: 03/29/22 1127    Specimen: Blood Updated: 03/29/22 1333    Narrative:      The following orders were created for panel order [place]  Draw.  Procedure                               Abnormality         Status                     ---------                               -----------         ------                     Green Top (Gel)[[id number]]                                  Final result               Lavender Top[[id number]]                                     Final result               Gold Top - SST[[id number]]                                   Final result               Light Blue Top[[id number]]                                   Final result                 Please view results for these tests on the individual orders.    Gold Top - SST [[id number]] Collected: 03/29/22 1224    Specimen: Blood Updated: 03/29/22 1333     Extra Tube Hold for add-ons.     Comment: Auto resulted.       BNP [[id number]]  (Normal) Collected: 03/29/22 1225    Specimen: Blood Updated: 03/29/22 1326     proBNP 735.0 pg/mL     Narrative:      Among patients with dyspnea, NT-proBNP is highly sensitive for the detection of acute congestive heart failure. In addition NT-proBNP of <300 pg/ml effectively rules out acute congestive heart failure with 99% negative predictive value.    Results may be falsely decreased if patient taking Biotin.      Comprehensive Metabolic Panel [[id number]]  (Abnormal) Collected: 03/29/22 1225    Specimen: Blood Updated: 03/29/22 1259     Glucose 99 mg/dL      BUN 19 mg/dL      Creatinine 1.25 mg/dL      Sodium 139 mmol/L      Potassium 5.6 mmol/L      Chloride 105 mmol/L      CO2 27.0 mmol/L      Calcium 9.1 mg/dL      Total Protein 6.8 g/dL      Albumin 3.90 g/dL      ALT (SGPT) 12 U/L      AST (SGOT) 18 U/L      Alkaline Phosphatase 65 U/L      Total Bilirubin 0.4 mg/dL      Globulin 2.9 gm/dL      A/G Ratio 1.3 g/dL      BUN/Creatinine Ratio 15.2     Anion Gap 7.0 mmol/L      eGFR 43.9 mL/min/1.73      Comment: National Kidney Foundation and American Society of Nephrology (ASN) Task Force recommended calculation based on the Chronic Kidney Disease  Epidemiology Collaboration (CKD-EPI) equation refit without adjustment for race.       Narrative:      GFR Normal >60  Chronic Kidney Disease <60  Kidney Failure <15      COVID PRE-OP / PRE-PROCEDURE SCREENING ORDER (NO ISOLATION) - Swab, Nasopharynx [[id number]]  (Normal) Collected: 03/29/22 1207    Specimen: Swab from Nasopharynx Updated: 03/29/22 1235    Narrative:      The following orders were created for panel order COVID PRE-OP / PRE-PROCEDURE SCREENING ORDER (NO ISOLATION) - Swab, Nasopharynx.  Procedure                               Abnormality         Status                     ---------                               -----------         ------                     COVID-19,[facility] IN-HOUSE...[[id number]]  Normal              Final result                 Please view results for these tests on the individual orders.    COVID-19,[facility] IN-HOUSE CEPHEID/LILY NP SWAB IN TRANSPORT MEDIA 8-12 HR TAT - Swab, Nasopharynx [[id number]]  (Normal) Collected: 03/29/22 1207    Specimen: Swab from Nasopharynx Updated: 03/29/22 1235     COVID19 Not Detected    Narrative:      Fact sheet for providers: https://www.fda.gov/media/350440/download    Fact sheet for patients: https://www.fda.gov/media/478136/download    Test performed by PCR.    Lavender Top [[id number]] Collected: 03/29/22 1127    Specimen: Blood Updated: 03/29/22 1233     Extra Tube hold for add-on     Comment: Auto resulted       Green Top (Gel) [[id number]] Collected: 03/29/22 1127    Specimen: Blood Updated: 03/29/22 1233     Extra Tube Hold for add-ons.     Comment: Auto resulted.       Light Blue Top [[id number]] Collected: 03/29/22 1127    Specimen: Blood Updated: 03/29/22 1233     Extra Tube hold for add-on     Comment: Auto resulted       Troponin [[id number]]  (Normal) Collected: 03/29/22 1127    Specimen: Blood Updated: 03/29/22 1152     Troponin T <0.010 ng/mL     Narrative:      Troponin T Reference Range:  <= 0.03 ng/mL-   Negative for AMI  >0.03 ng/mL-      Abnormal for myocardial necrosis.  Clinicians would have to utilize clinical acumen, EKG, Troponin and serial changes to determine if it is an Acute Myocardial Infarction or myocardial injury due to an underlying chronic condition.       Results may be falsely decreased if patient taking Biotin.      aPTT [[id number]]  (Normal) Collected: 03/29/22 1127    Specimen: Blood Updated: 03/29/22 1143     PTT 32.3 seconds     Protime-INR [[id number]]  (Abnormal) Collected: 03/29/22 1127    Specimen: Blood Updated: 03/29/22 1143     Protime 25.9 Seconds      INR 2.37    CBC & Differential [[id number]]  (Normal) Collected: 03/29/22 1127    Specimen: Blood Updated: 03/29/22 1133    Narrative:      The following orders were created for panel order CBC & Differential.  Procedure                               Abnormality         Status                     ---------                               -----------         ------                     CBC Auto Differential[[id number]]        Normal              Final result                 Please view results for these tests on the individual orders.    CBC Auto Differential [[id number]]  (Normal) Collected: 03/29/22 1127    Specimen: Blood Updated: 03/29/22 1133     WBC 7.04 10*3/mm3      RBC 4.25 10*6/mm3      Hemoglobin 13.0 g/dL      Hematocrit 40.1 %      MCV 94.4 fL      MCH 30.6 pg      MCHC 32.4 g/dL      RDW 14.7 %      RDW-SD 50.8 fl      MPV 11.1 fL      Platelets 305 10*3/mm3      Neutrophil % 59.6 %      Lymphocyte % 28.0 %      Monocyte % 8.1 %      Eosinophil % 3.7 %      Basophil % 0.3 %      Immature Grans % 0.3 %      Neutrophils, Absolute 4.20 10*3/mm3      Lymphocytes, Absolute 1.97 10*3/mm3      Monocytes, Absolute 0.57 10*3/mm3      Eosinophils, Absolute 0.26 10*3/mm3      Basophils, Absolute 0.02 10*3/mm3      Immature Grans, Absolute 0.02 10*3/mm3      nRBC 0.0 /100 WBC         Data Review:  Results from last 7 days   Lab Units 04/03/22  0644 04/02/22  3148  04/01/22  0628   SODIUM mmol/L 141 142 142   POTASSIUM mmol/L 4.1 4.7 4.6   CHLORIDE mmol/L 106 108* 109*   CO2 mmol/L 27.4 24.0 26.2   BUN mg/dL 17 14 17   CREATININE mg/dL 1.29* 1.11* 1.15*   GLUCOSE mg/dL 97 106* 105*   CALCIUM mg/dL 9.1 8.9 9.0     Results from last 7 days   Lab Units 04/03/22  0644 04/02/22  0752 03/31/22  0734   WBC 10*3/mm3 7.14 8.14 8.63   HEMOGLOBIN g/dL 12.0 12.8 12.5   HEMATOCRIT % 35.8 40.8 38.2   PLATELETS 10*3/mm3 197 201 210             Lab Results   Lab Value Date/Time    TROPONINT <0.010 03/29/2022 1127    TROPONINT <0.010 08/22/2019 1957         Results from last 7 days   Lab Units 03/30/22  0755 03/29/22  1225   ALK PHOS U/L 61 65   BILIRUBIN mg/dL 0.5 0.4   ALT (SGPT) U/L 22 12   AST (SGOT) U/L 18 18             Glucose   Date/Time Value Ref Range Status   04/03/2022 1146 123 70 - 130 mg/dL Final     Comment:     Meter: [id number] : [id number] [facility]   04/03/2022 0730 93 70 - 130 mg/dL Final     Comment:     Meter: [id number] : [id number] [facility]   04/02/2022 1718 91 70 - 130 mg/dL Final     Comment:     Meter: [id number] : [id number] [facility]   04/02/2022 1133 109 70 - 130 mg/dL Final     Comment:     Meter: [id number] : [id number] [facility]   04/02/2022 0737 90 70 - 130 mg/dL Final     Comment:     Meter: [id number] : [id number] [facility]   04/01/2022 2158 121 70 - 130 mg/dL Final     Comment:     Meter: [id number] : [id number] Abena Feng    04/01/2022 1710 114 70 - 130 mg/dL Final     Comment:     Meter: [id number] : [id number] Lito BARROSOA   04/01/2022 1209 96 70 - 130 mg/dL Final     Comment:     Meter: [id number] : [id number] Lito Bills CNA     Results from last 7 days   Lab Units 04/03/22  0644 04/02/22  0752 04/01/22  0628   INR  1.93* 1.94* 1.99*       Past Medical History:   Diagnosis Date   • Anxiety    • Atherosclerotic heart disease of native coronary  artery without angina pectoris    • Cardiac pacemaker    • Chronic kidney disease, stage 3 (HCC)    • Dementia without behavioral disturbance (HCC)    • Hyperlipidemia    • Major depressive disorder    • Malignant neoplasm of left breast (HCC)    • Obesity    • Portal vein thrombosis    • Type 2 diabetes mellitus (HCC)        Assessment:  Active Hospital Problems    Diagnosis  POA   • **Altered mental status [R41.82]  Yes   • KAIA (obstructive sleep apnea) [G47.33]  Unknown   • Acute on chronic respiratory failure with hypercapnia (HCC) [J96.22]  Unknown   • Type 2 diabetes mellitus (HCC) [E11.9]  Yes   • Atherosclerotic heart disease of native coronary artery without angina pectoris [I25.10]  Yes   • Chronic kidney disease, stage 3 (HCC) [N18.30]  Yes   • Sick sinus syndrome (HCC) [I49.5]  Yes   • Hypernatremia [E87.0]  Clinically Undetermined   • HTN (hypertension) [I10]  Yes   • DNR (do not resuscitate) [Z66]  Yes   • Dementia (HCC) [F03.90]  Yes   • History of breast cancer [Z85.3]  Not Applicable   • Chronic anticoagulation [Z79.01]  Not Applicable   • Hyperkalemia [E87.5]  Yes      Resolved Hospital Problems   No resolved problems to display.       Plan:   pulmonary consult noted. ON CPAP.   Continue current RX. Follow lab.   She will need SNU for rehab when stable.  Alexandre Ramsey MD  4/3/2022  12:50 EDT

## 2022-04-04 LAB — CREAT BLDA-MCNC: 1.4 MG/DL (ref 0.6–1.3)

## 2022-04-04 NOTE — CASE MANAGEMENT/SOCIAL WORK
Case Management Discharge Note      Final Note: discharged to [facility] - Discharged on 4/3/2022 Admission date: 3/29/2022 - Discharge disposition: Skilled Nursing Facility (DC - External)    Destination Coordination complete.    Service Provider Selected Services Address Phone Fax Patient Preferred    [facility]  [facility] [place]note 2[facility] [facility] [id number] [phone] --          Durable Medical Equipment    No services have been selected for the patient.              Dialysis/Infusion    No services have been selected for the patient.              Home Medical Care    No services have been selected for the patient.              Therapy    No services have been selected for the patient.              Community Resources    No services have been selected for the patient.              Community & DME    No services have been selected for the patient.                  Transportation Services  Private: Car    Final Discharge Disposition Code: 03 - skilled nursing facility (SNF)

## 2022-04-19 ENCOUNTER — HOSPITAL ENCOUNTER (INPATIENT)
Facility: HOSPITAL | Age: 82
LOS: 3 days | Discharge: HOSPICE | End: 2022-04-22
Attending: INTERNAL MEDICINE | Admitting: INTERNAL MEDICINE

## 2022-04-19 ENCOUNTER — APPOINTMENT (OUTPATIENT)
Dept: CT IMAGING | Facility: HOSPITAL | Age: 82
End: 2022-04-19

## 2022-04-19 ENCOUNTER — APPOINTMENT (OUTPATIENT)
Dept: GENERAL RADIOLOGY | Facility: HOSPITAL | Age: 82
End: 2022-04-19

## 2022-04-19 DIAGNOSIS — J96.22 ACUTE ON CHRONIC RESPIRATORY FAILURE WITH HYPERCAPNIA: Primary | ICD-10-CM

## 2022-04-19 DIAGNOSIS — R41.82 ALTERED MENTAL STATUS, UNSPECIFIED ALTERED MENTAL STATUS TYPE: ICD-10-CM

## 2022-04-19 LAB
ALBUMIN SERPL-MCNC: 3.9 G/DL (ref 3.5–5.2)
ALBUMIN/GLOB SERPL: 1.2 G/DL
ALP SERPL-CCNC: 70 U/L (ref 39–117)
ALT SERPL W P-5'-P-CCNC: 16 U/L (ref 1–33)
ANION GAP SERPL CALCULATED.3IONS-SCNC: 11 MMOL/L (ref 5–15)
ANION GAP SERPL CALCULATED.3IONS-SCNC: 9 MMOL/L (ref 5–15)
APTT PPP: 36.3 SECONDS (ref 22.7–35.4)
ARTERIAL PATENCY WRIST A: POSITIVE
AST SERPL-CCNC: 15 U/L (ref 1–32)
ATMOSPHERIC PRESS: 755.6 MMHG
ATMOSPHERIC PRESS: 756.5 MMHG
ATMOSPHERIC PRESS: 757.9 MMHG
BACTERIA UR QL AUTO: ABNORMAL /HPF
BASE EXCESS BLDA CALC-SCNC: 0.8 MMOL/L (ref 0–2)
BASE EXCESS BLDA CALC-SCNC: 0.8 MMOL/L (ref 0–2)
BASE EXCESS BLDA CALC-SCNC: 1.4 MMOL/L (ref 0–2)
BASOPHILS # BLD AUTO: 0.02 10*3/MM3 (ref 0–0.2)
BASOPHILS # BLD AUTO: 0.02 10*3/MM3 (ref 0–0.2)
BASOPHILS NFR BLD AUTO: 0.3 % (ref 0–1.5)
BASOPHILS NFR BLD AUTO: 0.3 % (ref 0–1.5)
BDY SITE: ABNORMAL
BILIRUB SERPL-MCNC: 0.5 MG/DL (ref 0–1.2)
BILIRUB UR QL STRIP: NEGATIVE
BUN SERPL-MCNC: 28 MG/DL (ref 8–23)
BUN SERPL-MCNC: 28 MG/DL (ref 8–23)
BUN/CREAT SERPL: 17.7 (ref 7–25)
BUN/CREAT SERPL: 19.9 (ref 7–25)
CALCIUM SPEC-SCNC: 9.4 MG/DL (ref 8.6–10.5)
CALCIUM SPEC-SCNC: 9.5 MG/DL (ref 8.6–10.5)
CHLORIDE SERPL-SCNC: 104 MMOL/L (ref 98–107)
CHLORIDE SERPL-SCNC: 105 MMOL/L (ref 98–107)
CLARITY UR: CLEAR
CO2 SERPL-SCNC: 26 MMOL/L (ref 22–29)
CO2 SERPL-SCNC: 27 MMOL/L (ref 22–29)
COLOR UR: YELLOW
CREAT SERPL-MCNC: 1.41 MG/DL (ref 0.57–1)
CREAT SERPL-MCNC: 1.58 MG/DL (ref 0.57–1)
DEPRECATED RDW RBC AUTO: 44.9 FL (ref 37–54)
DEPRECATED RDW RBC AUTO: 46.3 FL (ref 37–54)
EGFRCR SERPLBLD CKD-EPI 2021: 33.2 ML/MIN/1.73
EGFRCR SERPLBLD CKD-EPI 2021: 38 ML/MIN/1.73
EOSINOPHIL # BLD AUTO: 0.18 10*3/MM3 (ref 0–0.4)
EOSINOPHIL # BLD AUTO: 0.22 10*3/MM3 (ref 0–0.4)
EOSINOPHIL NFR BLD AUTO: 2.6 % (ref 0.3–6.2)
EOSINOPHIL NFR BLD AUTO: 3.5 % (ref 0.3–6.2)
ERYTHROCYTE [DISTWIDTH] IN BLOOD BY AUTOMATED COUNT: 13.8 % (ref 12.3–15.4)
ERYTHROCYTE [DISTWIDTH] IN BLOOD BY AUTOMATED COUNT: 14 % (ref 12.3–15.4)
GAS FLOW AIRWAY: 2 LPM
GAS FLOW AIRWAY: 4 LPM
GLOBULIN UR ELPH-MCNC: 3.2 GM/DL
GLUCOSE SERPL-MCNC: 103 MG/DL (ref 65–99)
GLUCOSE SERPL-MCNC: 90 MG/DL (ref 65–99)
GLUCOSE UR STRIP-MCNC: NEGATIVE MG/DL
HCO3 BLDA-SCNC: 26.8 MMOL/L (ref 22–28)
HCO3 BLDA-SCNC: 27.8 MMOL/L (ref 22–28)
HCO3 BLDA-SCNC: 29.1 MMOL/L (ref 22–28)
HCT VFR BLD AUTO: 35.6 % (ref 34–46.6)
HCT VFR BLD AUTO: 40.3 % (ref 34–46.6)
HGB BLD-MCNC: 11.8 G/DL (ref 12–15.9)
HGB BLD-MCNC: 13.1 G/DL (ref 12–15.9)
HGB UR QL STRIP.AUTO: NEGATIVE
HOLD SPECIMEN: NORMAL
HYALINE CASTS UR QL AUTO: ABNORMAL /LPF
IMM GRANULOCYTES # BLD AUTO: 0.02 10*3/MM3 (ref 0–0.05)
IMM GRANULOCYTES # BLD AUTO: 0.02 10*3/MM3 (ref 0–0.05)
IMM GRANULOCYTES NFR BLD AUTO: 0.3 % (ref 0–0.5)
IMM GRANULOCYTES NFR BLD AUTO: 0.3 % (ref 0–0.5)
INHALED O2 CONCENTRATION: 40 %
INR PPP: 2.26 (ref 0.9–1.1)
KETONES UR QL STRIP: NEGATIVE
LEUKOCYTE ESTERASE UR QL STRIP.AUTO: ABNORMAL
LYMPHOCYTES # BLD AUTO: 1.7 10*3/MM3 (ref 0.7–3.1)
LYMPHOCYTES # BLD AUTO: 1.82 10*3/MM3 (ref 0.7–3.1)
LYMPHOCYTES NFR BLD AUTO: 24.4 % (ref 19.6–45.3)
LYMPHOCYTES NFR BLD AUTO: 28.8 % (ref 19.6–45.3)
MCH RBC QN AUTO: 29.3 PG (ref 26.6–33)
MCH RBC QN AUTO: 29.6 PG (ref 26.6–33)
MCHC RBC AUTO-ENTMCNC: 32.5 G/DL (ref 31.5–35.7)
MCHC RBC AUTO-ENTMCNC: 33.1 G/DL (ref 31.5–35.7)
MCV RBC AUTO: 89.4 FL (ref 79–97)
MCV RBC AUTO: 90.2 FL (ref 79–97)
MODALITY: ABNORMAL
MONOCYTES # BLD AUTO: 0.48 10*3/MM3 (ref 0.1–0.9)
MONOCYTES # BLD AUTO: 0.57 10*3/MM3 (ref 0.1–0.9)
MONOCYTES NFR BLD AUTO: 6.9 % (ref 5–12)
MONOCYTES NFR BLD AUTO: 9 % (ref 5–12)
NEUTROPHILS NFR BLD AUTO: 3.68 10*3/MM3 (ref 1.7–7)
NEUTROPHILS NFR BLD AUTO: 4.57 10*3/MM3 (ref 1.7–7)
NEUTROPHILS NFR BLD AUTO: 58.1 % (ref 42.7–76)
NEUTROPHILS NFR BLD AUTO: 65.5 % (ref 42.7–76)
NITRITE UR QL STRIP: NEGATIVE
NRBC BLD AUTO-RTO: 0 /100 WBC (ref 0–0.2)
NRBC BLD AUTO-RTO: 0 /100 WBC (ref 0–0.2)
O2 A-A PPRESDIFF RESPIRATORY: 0.5 MMHG
PCO2 BLDA: 47.3 MM HG (ref 35–45)
PCO2 BLDA: 50.6 MM HG (ref 35–45)
PCO2 BLDA: 63.3 MM HG (ref 35–45)
PH BLDA: 7.27 PH UNITS (ref 7.35–7.45)
PH BLDA: 7.35 PH UNITS (ref 7.35–7.45)
PH BLDA: 7.36 PH UNITS (ref 7.35–7.45)
PH UR STRIP.AUTO: 6 [PH] (ref 5–8)
PLATELET # BLD AUTO: 233 10*3/MM3 (ref 140–450)
PLATELET # BLD AUTO: 241 10*3/MM3 (ref 140–450)
PMV BLD AUTO: 10.2 FL (ref 6–12)
PMV BLD AUTO: 9.9 FL (ref 6–12)
PO2 BLDA: 118.7 MM HG (ref 80–100)
PO2 BLDA: 131.7 MM HG (ref 80–100)
PO2 BLDA: 95.8 MM HG (ref 80–100)
POTASSIUM SERPL-SCNC: 5.1 MMOL/L (ref 3.5–5.2)
POTASSIUM SERPL-SCNC: 5.8 MMOL/L (ref 3.5–5.2)
PROT SERPL-MCNC: 7.1 G/DL (ref 6–8.5)
PROT UR QL STRIP: NEGATIVE
PROTHROMBIN TIME: 25 SECONDS (ref 11.7–14.2)
PSV: 18 CMH2O
RBC # BLD AUTO: 3.98 10*6/MM3 (ref 3.77–5.28)
RBC # BLD AUTO: 4.47 10*6/MM3 (ref 3.77–5.28)
RBC # UR STRIP: ABNORMAL /HPF
REF LAB TEST METHOD: ABNORMAL
SAO2 % BLDCOA: 97.1 % (ref 92–99)
SAO2 % BLDCOA: 98.3 % (ref 92–99)
SAO2 % BLDCOA: 98.4 % (ref 92–99)
SARS-COV-2 RNA RESP QL NAA+PROBE: NOT DETECTED
SET MECH RESP RATE: 20
SODIUM SERPL-SCNC: 139 MMOL/L (ref 136–145)
SODIUM SERPL-SCNC: 143 MMOL/L (ref 136–145)
SP GR UR STRIP: 1.01 (ref 1–1.03)
SQUAMOUS #/AREA URNS HPF: ABNORMAL /HPF
TOTAL RATE: 16 BREATHS/MINUTE
TOTAL RATE: 16 BREATHS/MINUTE
TOTAL RATE: 21 BREATHS/MINUTE
UROBILINOGEN UR QL STRIP: ABNORMAL
VENTILATOR MODE: ABNORMAL
WBC # UR STRIP: ABNORMAL /HPF
WBC NRBC COR # BLD: 6.33 10*3/MM3 (ref 3.4–10.8)
WBC NRBC COR # BLD: 6.97 10*3/MM3 (ref 3.4–10.8)
WHOLE BLOOD HOLD SPECIMEN: NORMAL
WHOLE BLOOD HOLD SPECIMEN: NORMAL

## 2022-04-19 PROCEDURE — 70450 CT HEAD/BRAIN W/O DYE: CPT

## 2022-04-19 PROCEDURE — 93010 ELECTROCARDIOGRAM REPORT: CPT | Performed by: INTERNAL MEDICINE

## 2022-04-19 PROCEDURE — 85025 COMPLETE CBC W/AUTO DIFF WBC: CPT | Performed by: INTERNAL MEDICINE

## 2022-04-19 PROCEDURE — 94799 UNLISTED PULMONARY SVC/PX: CPT

## 2022-04-19 PROCEDURE — 85730 THROMBOPLASTIN TIME PARTIAL: CPT | Performed by: NURSE PRACTITIONER

## 2022-04-19 PROCEDURE — 81001 URINALYSIS AUTO W/SCOPE: CPT | Performed by: NURSE PRACTITIONER

## 2022-04-19 PROCEDURE — 94660 CPAP INITIATION&MGMT: CPT

## 2022-04-19 PROCEDURE — 71045 X-RAY EXAM CHEST 1 VIEW: CPT

## 2022-04-19 PROCEDURE — 80053 COMPREHEN METABOLIC PANEL: CPT | Performed by: NURSE PRACTITIONER

## 2022-04-19 PROCEDURE — 82803 BLOOD GASES ANY COMBINATION: CPT

## 2022-04-19 PROCEDURE — 36600 WITHDRAWAL OF ARTERIAL BLOOD: CPT

## 2022-04-19 PROCEDURE — 85610 PROTHROMBIN TIME: CPT | Performed by: NURSE PRACTITIONER

## 2022-04-19 PROCEDURE — 99285 EMERGENCY DEPT VISIT HI MDM: CPT

## 2022-04-19 PROCEDURE — U0003 INFECTIOUS AGENT DETECTION BY NUCLEIC ACID (DNA OR RNA); SEVERE ACUTE RESPIRATORY SYNDROME CORONAVIRUS 2 (SARS-COV-2) (CORONAVIRUS DISEASE [COVID-19]), AMPLIFIED PROBE TECHNIQUE, MAKING USE OF HIGH THROUGHPUT TECHNOLOGIES AS DESCRIBED BY CMS-2020-01-R: HCPCS | Performed by: EMERGENCY MEDICINE

## 2022-04-19 PROCEDURE — 85025 COMPLETE CBC W/AUTO DIFF WBC: CPT | Performed by: NURSE PRACTITIONER

## 2022-04-19 PROCEDURE — 93005 ELECTROCARDIOGRAM TRACING: CPT | Performed by: NURSE PRACTITIONER

## 2022-04-19 RX ORDER — NICOTINE POLACRILEX 4 MG
15 LOZENGE BUCCAL
Status: DISCONTINUED | OUTPATIENT
Start: 2022-04-19 | End: 2022-04-20

## 2022-04-19 RX ORDER — SODIUM CHLORIDE 0.9 % (FLUSH) 0.9 %
10 SYRINGE (ML) INJECTION AS NEEDED
Status: DISCONTINUED | OUTPATIENT
Start: 2022-04-19 | End: 2022-04-21

## 2022-04-19 RX ORDER — TRAZODONE HYDROCHLORIDE 50 MG/1
25 TABLET ORAL NIGHTLY
COMMUNITY
End: 2022-04-22 | Stop reason: HOSPADM

## 2022-04-19 RX ORDER — SODIUM CHLORIDE 0.9 % (FLUSH) 0.9 %
10 SYRINGE (ML) INJECTION EVERY 12 HOURS SCHEDULED
Status: DISCONTINUED | OUTPATIENT
Start: 2022-04-19 | End: 2022-04-21

## 2022-04-19 RX ORDER — DEXTROSE MONOHYDRATE 25 G/50ML
25 INJECTION, SOLUTION INTRAVENOUS
Status: DISCONTINUED | OUTPATIENT
Start: 2022-04-19 | End: 2022-04-20

## 2022-04-19 RX ORDER — CLONAZEPAM 0.5 MG/1
0.5 TABLET ORAL 2 TIMES DAILY PRN
COMMUNITY
End: 2022-04-22 | Stop reason: HOSPADM

## 2022-04-20 LAB
ANION GAP SERPL CALCULATED.3IONS-SCNC: 9.6 MMOL/L (ref 5–15)
BASOPHILS # BLD AUTO: 0.02 10*3/MM3 (ref 0–0.2)
BASOPHILS NFR BLD AUTO: 0.3 % (ref 0–1.5)
BUN SERPL-MCNC: 30 MG/DL (ref 8–23)
BUN/CREAT SERPL: 21.9 (ref 7–25)
CALCIUM SPEC-SCNC: 9.2 MG/DL (ref 8.6–10.5)
CHLORIDE SERPL-SCNC: 107 MMOL/L (ref 98–107)
CO2 SERPL-SCNC: 25.4 MMOL/L (ref 22–29)
CREAT SERPL-MCNC: 1.37 MG/DL (ref 0.57–1)
DEPRECATED RDW RBC AUTO: 46.4 FL (ref 37–54)
EGFRCR SERPLBLD CKD-EPI 2021: 39.4 ML/MIN/1.73
EOSINOPHIL # BLD AUTO: 0.23 10*3/MM3 (ref 0–0.4)
EOSINOPHIL NFR BLD AUTO: 3.3 % (ref 0.3–6.2)
ERYTHROCYTE [DISTWIDTH] IN BLOOD BY AUTOMATED COUNT: 13.9 % (ref 12.3–15.4)
GLUCOSE BLDC GLUCOMTR-MCNC: 74 MG/DL (ref 70–130)
GLUCOSE BLDC GLUCOMTR-MCNC: 79 MG/DL (ref 70–130)
GLUCOSE BLDC GLUCOMTR-MCNC: 81 MG/DL (ref 70–130)
GLUCOSE BLDC GLUCOMTR-MCNC: 92 MG/DL (ref 70–130)
GLUCOSE BLDC GLUCOMTR-MCNC: 95 MG/DL (ref 70–130)
GLUCOSE SERPL-MCNC: 84 MG/DL (ref 65–99)
HCT VFR BLD AUTO: 34.6 % (ref 34–46.6)
HGB BLD-MCNC: 11.1 G/DL (ref 12–15.9)
IMM GRANULOCYTES # BLD AUTO: 0.02 10*3/MM3 (ref 0–0.05)
IMM GRANULOCYTES NFR BLD AUTO: 0.3 % (ref 0–0.5)
INR PPP: 2.21 (ref 0.9–1.1)
LYMPHOCYTES # BLD AUTO: 1.54 10*3/MM3 (ref 0.7–3.1)
LYMPHOCYTES NFR BLD AUTO: 22.3 % (ref 19.6–45.3)
MCH RBC QN AUTO: 29.3 PG (ref 26.6–33)
MCHC RBC AUTO-ENTMCNC: 32.1 G/DL (ref 31.5–35.7)
MCV RBC AUTO: 91.3 FL (ref 79–97)
MONOCYTES # BLD AUTO: 0.68 10*3/MM3 (ref 0.1–0.9)
MONOCYTES NFR BLD AUTO: 9.8 % (ref 5–12)
NEUTROPHILS NFR BLD AUTO: 4.42 10*3/MM3 (ref 1.7–7)
NEUTROPHILS NFR BLD AUTO: 64 % (ref 42.7–76)
NRBC BLD AUTO-RTO: 0 /100 WBC (ref 0–0.2)
NT-PROBNP SERPL-MCNC: 607 PG/ML (ref 0–1800)
PLATELET # BLD AUTO: 230 10*3/MM3 (ref 140–450)
PMV BLD AUTO: 10 FL (ref 6–12)
POTASSIUM SERPL-SCNC: 4.8 MMOL/L (ref 3.5–5.2)
PROTHROMBIN TIME: 24.6 SECONDS (ref 11.7–14.2)
QT INTERVAL: 457 MS
QT INTERVAL: 464 MS
RBC # BLD AUTO: 3.79 10*6/MM3 (ref 3.77–5.28)
SODIUM SERPL-SCNC: 142 MMOL/L (ref 136–145)
WBC NRBC COR # BLD: 6.91 10*3/MM3 (ref 3.4–10.8)

## 2022-04-20 PROCEDURE — 85025 COMPLETE CBC W/AUTO DIFF WBC: CPT | Performed by: INTERNAL MEDICINE

## 2022-04-20 PROCEDURE — 82962 GLUCOSE BLOOD TEST: CPT

## 2022-04-20 PROCEDURE — 83880 ASSAY OF NATRIURETIC PEPTIDE: CPT | Performed by: HOSPITALIST

## 2022-04-20 PROCEDURE — 80048 BASIC METABOLIC PNL TOTAL CA: CPT | Performed by: INTERNAL MEDICINE

## 2022-04-20 PROCEDURE — 93010 ELECTROCARDIOGRAM REPORT: CPT | Performed by: INTERNAL MEDICINE

## 2022-04-20 PROCEDURE — 93005 ELECTROCARDIOGRAM TRACING: CPT | Performed by: INTERNAL MEDICINE

## 2022-04-20 PROCEDURE — 85610 PROTHROMBIN TIME: CPT | Performed by: INTERNAL MEDICINE

## 2022-04-20 RX ORDER — INSULIN LISPRO 100 [IU]/ML
0-7 INJECTION, SOLUTION INTRAVENOUS; SUBCUTANEOUS
Status: DISCONTINUED | OUTPATIENT
Start: 2022-04-20 | End: 2022-04-21

## 2022-04-20 RX ORDER — WARFARIN SODIUM 3 MG/1
3 TABLET ORAL 3 TIMES WEEKLY
Status: DISCONTINUED | OUTPATIENT
Start: 2022-04-20 | End: 2022-04-20

## 2022-04-20 RX ORDER — RISPERIDONE 0.25 MG/1
0.25 TABLET ORAL 2 TIMES DAILY
Status: DISCONTINUED | OUTPATIENT
Start: 2022-04-20 | End: 2022-04-20

## 2022-04-20 RX ORDER — PANTOPRAZOLE SODIUM 40 MG/1
40 TABLET, DELAYED RELEASE ORAL EVERY MORNING
Status: DISCONTINUED | OUTPATIENT
Start: 2022-04-20 | End: 2022-04-21

## 2022-04-20 RX ORDER — ATORVASTATIN CALCIUM 20 MG/1
40 TABLET, FILM COATED ORAL DAILY
Status: DISCONTINUED | OUTPATIENT
Start: 2022-04-20 | End: 2022-04-20

## 2022-04-20 RX ORDER — INSULIN LISPRO 100 [IU]/ML
0-7 INJECTION, SOLUTION INTRAVENOUS; SUBCUTANEOUS
Status: DISCONTINUED | OUTPATIENT
Start: 2022-04-20 | End: 2022-04-20

## 2022-04-20 RX ORDER — SOTALOL HYDROCHLORIDE 80 MG/1
40 TABLET ORAL 2 TIMES DAILY
Status: DISCONTINUED | OUTPATIENT
Start: 2022-04-20 | End: 2022-04-21

## 2022-04-20 RX ORDER — ACETAMINOPHEN 325 MG/1
650 TABLET ORAL EVERY 4 HOURS PRN
Status: DISCONTINUED | OUTPATIENT
Start: 2022-04-20 | End: 2022-04-21

## 2022-04-20 RX ORDER — NYSTATIN 100000 [USP'U]/G
POWDER TOPICAL 2 TIMES DAILY
Status: DISCONTINUED | OUTPATIENT
Start: 2022-04-20 | End: 2022-04-21

## 2022-04-20 RX ORDER — WARFARIN SODIUM 2.5 MG/1
2.5 TABLET ORAL
Status: DISCONTINUED | OUTPATIENT
Start: 2022-04-21 | End: 2022-04-21

## 2022-04-20 RX ORDER — WARFARIN SODIUM 2.5 MG/1
2.5 TABLET ORAL
Status: DISCONTINUED | OUTPATIENT
Start: 2022-04-21 | End: 2022-04-20

## 2022-04-20 RX ORDER — ATORVASTATIN CALCIUM 20 MG/1
10 TABLET, FILM COATED ORAL NIGHTLY
Status: DISCONTINUED | OUTPATIENT
Start: 2022-04-20 | End: 2022-04-21

## 2022-04-20 RX ORDER — NITROGLYCERIN 400 UG/1
1 SPRAY ORAL
Status: DISCONTINUED | OUTPATIENT
Start: 2022-04-20 | End: 2022-04-21

## 2022-04-20 RX ADMIN — SOTALOL HYDROCHLORIDE 40 MG: 80 TABLET ORAL at 20:45

## 2022-04-20 RX ADMIN — Medication 10 ML: at 10:48

## 2022-04-20 RX ADMIN — NYSTATIN: 100000 POWDER TOPICAL at 10:48

## 2022-04-20 RX ADMIN — RISPERIDONE 0.25 MG: 0.25 TABLET ORAL at 02:17

## 2022-04-20 RX ADMIN — ATORVASTATIN CALCIUM 40 MG: 20 TABLET, FILM COATED ORAL at 10:45

## 2022-04-20 RX ADMIN — Medication 10 ML: at 02:13

## 2022-04-20 RX ADMIN — RISPERIDONE 0.25 MG: 0.25 TABLET ORAL at 10:46

## 2022-04-20 RX ADMIN — WARFARIN 3 MG: 3 TABLET ORAL at 10:47

## 2022-04-20 RX ADMIN — NYSTATIN 1 APPLICATION: 100000 POWDER TOPICAL at 20:46

## 2022-04-20 RX ADMIN — Medication 10 ML: at 20:46

## 2022-04-20 RX ADMIN — NYSTATIN: 100000 POWDER TOPICAL at 02:14

## 2022-04-20 RX ADMIN — ATORVASTATIN CALCIUM 10 MG: 20 TABLET, FILM COATED ORAL at 20:45

## 2022-04-20 RX ADMIN — PANTOPRAZOLE SODIUM 40 MG: 40 TABLET, DELAYED RELEASE ORAL at 10:46

## 2022-04-20 RX ADMIN — SOTALOL HYDROCHLORIDE 40 MG: 80 TABLET ORAL at 10:47

## 2022-04-21 LAB
ALBUMIN SERPL-MCNC: 3.4 G/DL (ref 3.5–5.2)
ALBUMIN/GLOB SERPL: 1.3 G/DL
ALP SERPL-CCNC: 63 U/L (ref 39–117)
ALT SERPL W P-5'-P-CCNC: 13 U/L (ref 1–33)
ANION GAP SERPL CALCULATED.3IONS-SCNC: 10 MMOL/L (ref 5–15)
AST SERPL-CCNC: 15 U/L (ref 1–32)
BASOPHILS # BLD AUTO: 0.02 10*3/MM3 (ref 0–0.2)
BASOPHILS NFR BLD AUTO: 0.3 % (ref 0–1.5)
BILIRUB SERPL-MCNC: 0.5 MG/DL (ref 0–1.2)
BUN SERPL-MCNC: 24 MG/DL (ref 8–23)
BUN/CREAT SERPL: 19.4 (ref 7–25)
CALCIUM SPEC-SCNC: 9.2 MG/DL (ref 8.6–10.5)
CHLORIDE SERPL-SCNC: 106 MMOL/L (ref 98–107)
CHOLEST SERPL-MCNC: 87 MG/DL (ref 0–200)
CO2 SERPL-SCNC: 25 MMOL/L (ref 22–29)
CREAT SERPL-MCNC: 1.24 MG/DL (ref 0.57–1)
DEPRECATED RDW RBC AUTO: 47.2 FL (ref 37–54)
EGFRCR SERPLBLD CKD-EPI 2021: 44.4 ML/MIN/1.73
EOSINOPHIL # BLD AUTO: 0.24 10*3/MM3 (ref 0–0.4)
EOSINOPHIL NFR BLD AUTO: 4 % (ref 0.3–6.2)
ERYTHROCYTE [DISTWIDTH] IN BLOOD BY AUTOMATED COUNT: 14 % (ref 12.3–15.4)
GLOBULIN UR ELPH-MCNC: 2.7 GM/DL
GLUCOSE BLDC GLUCOMTR-MCNC: 111 MG/DL (ref 70–130)
GLUCOSE BLDC GLUCOMTR-MCNC: 124 MG/DL (ref 70–130)
GLUCOSE BLDC GLUCOMTR-MCNC: 91 MG/DL (ref 70–130)
GLUCOSE SERPL-MCNC: 86 MG/DL (ref 65–99)
HBA1C MFR BLD: 5.9 % (ref 4.8–5.6)
HCT VFR BLD AUTO: 36.2 % (ref 34–46.6)
HDLC SERPL-MCNC: 36 MG/DL (ref 40–60)
HGB BLD-MCNC: 11.7 G/DL (ref 12–15.9)
IMM GRANULOCYTES # BLD AUTO: 0.01 10*3/MM3 (ref 0–0.05)
IMM GRANULOCYTES NFR BLD AUTO: 0.2 % (ref 0–0.5)
INR PPP: 2.64 (ref 0.9–1.1)
LDLC SERPL CALC-MCNC: 34 MG/DL (ref 0–100)
LDLC/HDLC SERPL: 0.96 {RATIO}
LYMPHOCYTES # BLD AUTO: 1.57 10*3/MM3 (ref 0.7–3.1)
LYMPHOCYTES NFR BLD AUTO: 25.9 % (ref 19.6–45.3)
MCH RBC QN AUTO: 29.5 PG (ref 26.6–33)
MCHC RBC AUTO-ENTMCNC: 32.3 G/DL (ref 31.5–35.7)
MCV RBC AUTO: 91.4 FL (ref 79–97)
MONOCYTES # BLD AUTO: 0.6 10*3/MM3 (ref 0.1–0.9)
MONOCYTES NFR BLD AUTO: 9.9 % (ref 5–12)
NEUTROPHILS NFR BLD AUTO: 3.63 10*3/MM3 (ref 1.7–7)
NEUTROPHILS NFR BLD AUTO: 59.7 % (ref 42.7–76)
NRBC BLD AUTO-RTO: 0 /100 WBC (ref 0–0.2)
PLATELET # BLD AUTO: 236 10*3/MM3 (ref 140–450)
PMV BLD AUTO: 10.5 FL (ref 6–12)
POTASSIUM SERPL-SCNC: 4.7 MMOL/L (ref 3.5–5.2)
PROT SERPL-MCNC: 6.1 G/DL (ref 6–8.5)
PROTHROMBIN TIME: 28.3 SECONDS (ref 11.7–14.2)
RBC # BLD AUTO: 3.96 10*6/MM3 (ref 3.77–5.28)
SODIUM SERPL-SCNC: 141 MMOL/L (ref 136–145)
TRIGL SERPL-MCNC: 83 MG/DL (ref 0–150)
TSH SERPL DL<=0.05 MIU/L-ACNC: 1.44 UIU/ML (ref 0.27–4.2)
VLDLC SERPL-MCNC: 17 MG/DL (ref 5–40)
WBC NRBC COR # BLD: 6.07 10*3/MM3 (ref 3.4–10.8)

## 2022-04-21 PROCEDURE — 80053 COMPREHEN METABOLIC PANEL: CPT | Performed by: HOSPITALIST

## 2022-04-21 PROCEDURE — 94761 N-INVAS EAR/PLS OXIMETRY MLT: CPT

## 2022-04-21 PROCEDURE — 36415 COLL VENOUS BLD VENIPUNCTURE: CPT | Performed by: INTERNAL MEDICINE

## 2022-04-21 PROCEDURE — 94799 UNLISTED PULMONARY SVC/PX: CPT

## 2022-04-21 PROCEDURE — 82962 GLUCOSE BLOOD TEST: CPT

## 2022-04-21 PROCEDURE — 83036 HEMOGLOBIN GLYCOSYLATED A1C: CPT | Performed by: HOSPITALIST

## 2022-04-21 PROCEDURE — 85610 PROTHROMBIN TIME: CPT | Performed by: INTERNAL MEDICINE

## 2022-04-21 PROCEDURE — 94660 CPAP INITIATION&MGMT: CPT

## 2022-04-21 PROCEDURE — 80061 LIPID PANEL: CPT | Performed by: HOSPITALIST

## 2022-04-21 PROCEDURE — 94760 N-INVAS EAR/PLS OXIMETRY 1: CPT

## 2022-04-21 PROCEDURE — 85025 COMPLETE CBC W/AUTO DIFF WBC: CPT | Performed by: INTERNAL MEDICINE

## 2022-04-21 PROCEDURE — 84443 ASSAY THYROID STIM HORMONE: CPT | Performed by: HOSPITALIST

## 2022-04-21 RX ORDER — DIPHENHYDRAMINE HCL 25 MG
25 CAPSULE ORAL EVERY 6 HOURS PRN
Status: DISCONTINUED | OUTPATIENT
Start: 2022-04-21 | End: 2022-04-22 | Stop reason: HOSPADM

## 2022-04-21 RX ORDER — DIPHENOXYLATE HYDROCHLORIDE AND ATROPINE SULFATE 2.5; .025 MG/1; MG/1
1 TABLET ORAL
Status: DISCONTINUED | OUTPATIENT
Start: 2022-04-21 | End: 2022-04-22 | Stop reason: HOSPADM

## 2022-04-21 RX ORDER — LORAZEPAM 2 MG/ML
2 INJECTION INTRAMUSCULAR
Status: DISCONTINUED | OUTPATIENT
Start: 2022-04-21 | End: 2022-04-22 | Stop reason: HOSPADM

## 2022-04-21 RX ORDER — PROMETHAZINE HYDROCHLORIDE 6.25 MG/5ML
6.25 SYRUP ORAL EVERY 4 HOURS PRN
Status: DISCONTINUED | OUTPATIENT
Start: 2022-04-21 | End: 2022-04-22 | Stop reason: HOSPADM

## 2022-04-21 RX ORDER — LORAZEPAM 2 MG/ML
1 INJECTION INTRAMUSCULAR
Status: DISCONTINUED | OUTPATIENT
Start: 2022-04-21 | End: 2022-04-22 | Stop reason: HOSPADM

## 2022-04-21 RX ORDER — DIPHENHYDRAMINE HYDROCHLORIDE 50 MG/ML
25 INJECTION INTRAMUSCULAR; INTRAVENOUS EVERY 6 HOURS PRN
Status: DISCONTINUED | OUTPATIENT
Start: 2022-04-21 | End: 2022-04-22 | Stop reason: HOSPADM

## 2022-04-21 RX ORDER — LORAZEPAM 2 MG/ML
0.5 INJECTION INTRAMUSCULAR
Status: DISCONTINUED | OUTPATIENT
Start: 2022-04-21 | End: 2022-04-22 | Stop reason: HOSPADM

## 2022-04-21 RX ORDER — ACETAMINOPHEN 325 MG/1
650 TABLET ORAL EVERY 4 HOURS PRN
Status: DISCONTINUED | OUTPATIENT
Start: 2022-04-21 | End: 2022-04-22 | Stop reason: HOSPADM

## 2022-04-21 RX ORDER — PROMETHAZINE HYDROCHLORIDE 25 MG/1
6.25 TABLET ORAL EVERY 4 HOURS PRN
Status: DISCONTINUED | OUTPATIENT
Start: 2022-04-21 | End: 2022-04-22 | Stop reason: HOSPADM

## 2022-04-21 RX ORDER — KETOROLAC TROMETHAMINE 15 MG/ML
15 INJECTION, SOLUTION INTRAMUSCULAR; INTRAVENOUS EVERY 6 HOURS PRN
Status: DISCONTINUED | OUTPATIENT
Start: 2022-04-21 | End: 2022-04-22 | Stop reason: HOSPADM

## 2022-04-21 RX ORDER — MORPHINE SULFATE 2 MG/ML
2 INJECTION, SOLUTION INTRAMUSCULAR; INTRAVENOUS
Status: DISCONTINUED | OUTPATIENT
Start: 2022-04-21 | End: 2022-04-22 | Stop reason: HOSPADM

## 2022-04-21 RX ORDER — HYDROMORPHONE HYDROCHLORIDE 1 MG/ML
0.5 INJECTION, SOLUTION INTRAMUSCULAR; INTRAVENOUS; SUBCUTANEOUS
Status: DISCONTINUED | OUTPATIENT
Start: 2022-04-21 | End: 2022-04-22 | Stop reason: HOSPADM

## 2022-04-21 RX ORDER — GLYCOPYRROLATE 0.2 MG/ML
0.4 INJECTION INTRAMUSCULAR; INTRAVENOUS
Status: DISCONTINUED | OUTPATIENT
Start: 2022-04-21 | End: 2022-04-22 | Stop reason: HOSPADM

## 2022-04-21 RX ORDER — HALOPERIDOL 5 MG/ML
1 INJECTION INTRAMUSCULAR EVERY 4 HOURS PRN
Status: DISCONTINUED | OUTPATIENT
Start: 2022-04-21 | End: 2022-04-22 | Stop reason: HOSPADM

## 2022-04-21 RX ORDER — LORAZEPAM 2 MG/ML
0.5 CONCENTRATE ORAL
Status: DISCONTINUED | OUTPATIENT
Start: 2022-04-21 | End: 2022-04-22 | Stop reason: HOSPADM

## 2022-04-21 RX ORDER — ACETAMINOPHEN 160 MG/5ML
650 SOLUTION ORAL EVERY 4 HOURS PRN
Status: DISCONTINUED | OUTPATIENT
Start: 2022-04-21 | End: 2022-04-22 | Stop reason: HOSPADM

## 2022-04-21 RX ORDER — ACETAMINOPHEN 650 MG/1
650 SUPPOSITORY RECTAL EVERY 4 HOURS PRN
Status: DISCONTINUED | OUTPATIENT
Start: 2022-04-21 | End: 2022-04-22 | Stop reason: HOSPADM

## 2022-04-21 RX ORDER — GLYCOPYRROLATE 0.2 MG/ML
0.2 INJECTION INTRAMUSCULAR; INTRAVENOUS
Status: DISCONTINUED | OUTPATIENT
Start: 2022-04-21 | End: 2022-04-22 | Stop reason: HOSPADM

## 2022-04-21 RX ORDER — HALOPERIDOL 1 MG/1
1 TABLET ORAL EVERY 4 HOURS PRN
Status: DISCONTINUED | OUTPATIENT
Start: 2022-04-21 | End: 2022-04-22 | Stop reason: HOSPADM

## 2022-04-21 RX ORDER — MORPHINE SULFATE 20 MG/ML
10 SOLUTION ORAL
Status: DISCONTINUED | OUTPATIENT
Start: 2022-04-21 | End: 2022-04-22 | Stop reason: HOSPADM

## 2022-04-21 RX ORDER — LORAZEPAM 2 MG/ML
2 CONCENTRATE ORAL
Status: DISCONTINUED | OUTPATIENT
Start: 2022-04-21 | End: 2022-04-22 | Stop reason: HOSPADM

## 2022-04-21 RX ORDER — PROMETHAZINE HYDROCHLORIDE 12.5 MG/1
6.25 SUPPOSITORY RECTAL EVERY 4 HOURS PRN
Status: DISCONTINUED | OUTPATIENT
Start: 2022-04-21 | End: 2022-04-22 | Stop reason: HOSPADM

## 2022-04-21 RX ORDER — LORAZEPAM 2 MG/ML
1 CONCENTRATE ORAL
Status: DISCONTINUED | OUTPATIENT
Start: 2022-04-21 | End: 2022-04-22 | Stop reason: HOSPADM

## 2022-04-21 RX ORDER — MORPHINE SULFATE 20 MG/ML
5 SOLUTION ORAL
Status: DISCONTINUED | OUTPATIENT
Start: 2022-04-21 | End: 2022-04-22 | Stop reason: HOSPADM

## 2022-04-21 RX ORDER — DIPHENHYDRAMINE HYDROCHLORIDE AND LIDOCAINE HYDROCHLORIDE AND ALUMINUM HYDROXIDE AND MAGNESIUM HYDRO
5 KIT EVERY 4 HOURS PRN
Status: DISCONTINUED | OUTPATIENT
Start: 2022-04-21 | End: 2022-04-22 | Stop reason: HOSPADM

## 2022-04-21 RX ORDER — HALOPERIDOL 2 MG/ML
1 SOLUTION ORAL EVERY 4 HOURS PRN
Status: DISCONTINUED | OUTPATIENT
Start: 2022-04-21 | End: 2022-04-22 | Stop reason: HOSPADM

## 2022-04-21 RX ORDER — MORPHINE SULFATE 2 MG/ML
4 INJECTION, SOLUTION INTRAMUSCULAR; INTRAVENOUS
Status: DISCONTINUED | OUTPATIENT
Start: 2022-04-21 | End: 2022-04-22 | Stop reason: HOSPADM

## 2022-04-21 RX ADMIN — PANTOPRAZOLE SODIUM 40 MG: 40 TABLET, DELAYED RELEASE ORAL at 09:11

## 2022-04-21 RX ADMIN — NYSTATIN: 100000 POWDER TOPICAL at 13:14

## 2022-04-21 RX ADMIN — SOTALOL HYDROCHLORIDE 40 MG: 80 TABLET ORAL at 09:11

## 2022-04-21 RX ADMIN — Medication 10 ML: at 09:09

## 2022-04-22 VITALS
DIASTOLIC BLOOD PRESSURE: 62 MMHG | OXYGEN SATURATION: 97 % | BODY MASS INDEX: 38.64 KG/M2 | HEART RATE: 59 BPM | HEIGHT: 62 IN | WEIGHT: 210 LBS | TEMPERATURE: 97.3 F | RESPIRATION RATE: 16 BRPM | SYSTOLIC BLOOD PRESSURE: 104 MMHG